# Patient Record
Sex: MALE | Race: WHITE | NOT HISPANIC OR LATINO
[De-identification: names, ages, dates, MRNs, and addresses within clinical notes are randomized per-mention and may not be internally consistent; named-entity substitution may affect disease eponyms.]

---

## 2017-01-05 ENCOUNTER — APPOINTMENT (OUTPATIENT)
Dept: UROLOGY | Facility: CLINIC | Age: 72
End: 2017-01-05

## 2017-01-05 LAB
APPEARANCE: CLEAR
BACTERIA: NEGATIVE
BASOPHILS # BLD AUTO: 0.04 K/UL
BASOPHILS NFR BLD AUTO: 0.6 %
BILIRUBIN URINE: NEGATIVE
BLOOD URINE: ABNORMAL
COLOR: YELLOW
EOSINOPHIL # BLD AUTO: 0.14 K/UL
EOSINOPHIL NFR BLD AUTO: 2 %
GLUCOSE QUALITATIVE U: 100 MG/DL
HCT VFR BLD CALC: 42.7 %
HGB BLD-MCNC: 14.3 G/DL
IMM GRANULOCYTES NFR BLD AUTO: 0.3 %
KETONES URINE: NEGATIVE
LEUKOCYTE ESTERASE URINE: NEGATIVE
LYMPHOCYTES # BLD AUTO: 1.96 K/UL
LYMPHOCYTES NFR BLD AUTO: 27.8 %
MAN DIFF?: NORMAL
MCHC RBC-ENTMCNC: 30 PG
MCHC RBC-ENTMCNC: 33.5 GM/DL
MCV RBC AUTO: 89.5 FL
MICROSCOPIC-UA: NORMAL
MONOCYTES # BLD AUTO: 0.41 K/UL
MONOCYTES NFR BLD AUTO: 5.8 %
NEUTROPHILS # BLD AUTO: 4.49 K/UL
NEUTROPHILS NFR BLD AUTO: 63.5 %
NITRITE URINE: NEGATIVE
PH URINE: 5.5
PLATELET # BLD AUTO: 235 K/UL
PROTEIN URINE: NEGATIVE MG/DL
RBC # BLD: 4.77 M/UL
RBC # FLD: 14.1 %
RED BLOOD CELLS URINE: 2 /HPF
SPECIFIC GRAVITY URINE: 1.02
SQUAMOUS EPITHELIAL CELLS: 0 /HPF
UROBILINOGEN URINE: NORMAL MG/DL
WBC # FLD AUTO: 7.06 K/UL
WHITE BLOOD CELLS URINE: 0 /HPF

## 2017-01-09 LAB
ALBUMIN SERPL ELPH-MCNC: 4.5 G/DL
ALP BLD-CCNC: 53 U/L
ALT SERPL-CCNC: 19 U/L
ANION GAP SERPL CALC-SCNC: 17 MMOL/L
AST SERPL-CCNC: 21 U/L
BACTERIA UR CULT: NORMAL
BILIRUB SERPL-MCNC: 0.4 MG/DL
BUN SERPL-MCNC: 17 MG/DL
CALCIUM SERPL-MCNC: 10.4 MG/DL
CHLORIDE SERPL-SCNC: 103 MMOL/L
CO2 SERPL-SCNC: 20 MMOL/L
CORE LAB FLUID CYTOLOGY: NORMAL
CREAT SERPL-MCNC: 0.72 MG/DL
GLUCOSE SERPL-MCNC: 136 MG/DL
POTASSIUM SERPL-SCNC: 4.2 MMOL/L
PROT SERPL-MCNC: 6.9 G/DL
PSA SERPL-MCNC: 0.75 NG/ML
SODIUM SERPL-SCNC: 140 MMOL/L
TESTOST SERPL-MCNC: 361.5 NG/DL

## 2017-07-12 ENCOUNTER — APPOINTMENT (OUTPATIENT)
Dept: UROLOGY | Facility: CLINIC | Age: 72
End: 2017-07-12

## 2020-11-03 ENCOUNTER — APPOINTMENT (OUTPATIENT)
Dept: ORTHOPEDIC SURGERY | Facility: CLINIC | Age: 75
End: 2020-11-03
Payer: MEDICARE

## 2020-11-03 VITALS — DIASTOLIC BLOOD PRESSURE: 80 MMHG | SYSTOLIC BLOOD PRESSURE: 130 MMHG | HEART RATE: 86 BPM

## 2020-11-03 VITALS — TEMPERATURE: 97.4 F

## 2020-11-03 PROCEDURE — 99072 ADDL SUPL MATRL&STAF TM PHE: CPT

## 2020-11-03 PROCEDURE — 73560 X-RAY EXAM OF KNEE 1 OR 2: CPT | Mod: RT

## 2020-11-03 PROCEDURE — 99204 OFFICE O/P NEW MOD 45 MIN: CPT | Mod: 95

## 2020-11-03 PROCEDURE — 73564 X-RAY EXAM KNEE 4 OR MORE: CPT | Mod: LT

## 2020-11-23 ENCOUNTER — OUTPATIENT (OUTPATIENT)
Dept: OUTPATIENT SERVICES | Facility: HOSPITAL | Age: 75
LOS: 1 days | End: 2020-11-23
Payer: MEDICARE

## 2020-11-23 VITALS
HEART RATE: 111 BPM | RESPIRATION RATE: 18 BRPM | SYSTOLIC BLOOD PRESSURE: 137 MMHG | TEMPERATURE: 97 F | HEIGHT: 65 IN | WEIGHT: 229.06 LBS | DIASTOLIC BLOOD PRESSURE: 88 MMHG | OXYGEN SATURATION: 96 %

## 2020-11-23 DIAGNOSIS — Z29.9 ENCOUNTER FOR PROPHYLACTIC MEASURES, UNSPECIFIED: ICD-10-CM

## 2020-11-23 DIAGNOSIS — Z01.818 ENCOUNTER FOR OTHER PREPROCEDURAL EXAMINATION: ICD-10-CM

## 2020-11-23 DIAGNOSIS — Z96.651 PRESENCE OF RIGHT ARTIFICIAL KNEE JOINT: Chronic | ICD-10-CM

## 2020-11-23 DIAGNOSIS — Z98.890 OTHER SPECIFIED POSTPROCEDURAL STATES: Chronic | ICD-10-CM

## 2020-11-23 DIAGNOSIS — Z90.89 ACQUIRED ABSENCE OF OTHER ORGANS: Chronic | ICD-10-CM

## 2020-11-23 DIAGNOSIS — M17.12 UNILATERAL PRIMARY OSTEOARTHRITIS, LEFT KNEE: ICD-10-CM

## 2020-11-23 DIAGNOSIS — Z90.49 ACQUIRED ABSENCE OF OTHER SPECIFIED PARTS OF DIGESTIVE TRACT: Chronic | ICD-10-CM

## 2020-11-23 DIAGNOSIS — R73.03 PREDIABETES: ICD-10-CM

## 2020-11-23 DIAGNOSIS — G47.33 OBSTRUCTIVE SLEEP APNEA (ADULT) (PEDIATRIC): ICD-10-CM

## 2020-11-23 LAB
A1C WITH ESTIMATED AVERAGE GLUCOSE RESULT: 8 % — HIGH (ref 4–5.6)
ANION GAP SERPL CALC-SCNC: 12 MMOL/L — SIGNIFICANT CHANGE UP (ref 5–17)
BLD GP AB SCN SERPL QL: NEGATIVE — SIGNIFICANT CHANGE UP
BUN SERPL-MCNC: 12 MG/DL — SIGNIFICANT CHANGE UP (ref 7–23)
CALCIUM SERPL-MCNC: 10.1 MG/DL — SIGNIFICANT CHANGE UP (ref 8.4–10.5)
CHLORIDE SERPL-SCNC: 101 MMOL/L — SIGNIFICANT CHANGE UP (ref 96–108)
CO2 SERPL-SCNC: 23 MMOL/L — SIGNIFICANT CHANGE UP (ref 22–31)
CREAT SERPL-MCNC: 0.56 MG/DL — SIGNIFICANT CHANGE UP (ref 0.5–1.3)
ESTIMATED AVERAGE GLUCOSE: 183 MG/DL — HIGH (ref 68–114)
GLUCOSE SERPL-MCNC: 210 MG/DL — HIGH (ref 70–99)
HCT VFR BLD CALC: 46.1 % — SIGNIFICANT CHANGE UP (ref 39–50)
HGB BLD-MCNC: 15.1 G/DL — SIGNIFICANT CHANGE UP (ref 13–17)
MCHC RBC-ENTMCNC: 29.3 PG — SIGNIFICANT CHANGE UP (ref 27–34)
MCHC RBC-ENTMCNC: 32.8 GM/DL — SIGNIFICANT CHANGE UP (ref 32–36)
MCV RBC AUTO: 89.5 FL — SIGNIFICANT CHANGE UP (ref 80–100)
MRSA PCR RESULT.: SIGNIFICANT CHANGE UP
NRBC # BLD: 0 /100 WBCS — SIGNIFICANT CHANGE UP (ref 0–0)
PLATELET # BLD AUTO: 231 K/UL — SIGNIFICANT CHANGE UP (ref 150–400)
POTASSIUM SERPL-MCNC: 3.7 MMOL/L — SIGNIFICANT CHANGE UP (ref 3.5–5.3)
POTASSIUM SERPL-SCNC: 3.7 MMOL/L — SIGNIFICANT CHANGE UP (ref 3.5–5.3)
RBC # BLD: 5.15 M/UL — SIGNIFICANT CHANGE UP (ref 4.2–5.8)
RBC # FLD: 13.8 % — SIGNIFICANT CHANGE UP (ref 10.3–14.5)
RH IG SCN BLD-IMP: POSITIVE — SIGNIFICANT CHANGE UP
S AUREUS DNA NOSE QL NAA+PROBE: SIGNIFICANT CHANGE UP
SODIUM SERPL-SCNC: 136 MMOL/L — SIGNIFICANT CHANGE UP (ref 135–145)
WBC # BLD: 7.41 K/UL — SIGNIFICANT CHANGE UP (ref 3.8–10.5)
WBC # FLD AUTO: 7.41 K/UL — SIGNIFICANT CHANGE UP (ref 3.8–10.5)

## 2020-11-23 RX ORDER — SODIUM CHLORIDE 9 MG/ML
3 INJECTION INTRAMUSCULAR; INTRAVENOUS; SUBCUTANEOUS EVERY 8 HOURS
Refills: 0 | Status: DISCONTINUED | OUTPATIENT
Start: 2020-11-30 | End: 2020-11-30

## 2020-11-23 RX ORDER — PANTOPRAZOLE SODIUM 20 MG/1
40 TABLET, DELAYED RELEASE ORAL ONCE
Refills: 0 | Status: COMPLETED | OUTPATIENT
Start: 2020-11-30 | End: 2020-11-30

## 2020-11-23 RX ORDER — LIDOCAINE HCL 20 MG/ML
0.2 VIAL (ML) INJECTION ONCE
Refills: 0 | Status: DISCONTINUED | OUTPATIENT
Start: 2020-11-30 | End: 2020-11-30

## 2020-11-23 RX ORDER — TRAMADOL HYDROCHLORIDE 50 MG/1
50 TABLET ORAL ONCE
Refills: 0 | Status: DISCONTINUED | OUTPATIENT
Start: 2020-11-30 | End: 2020-11-30

## 2020-11-23 RX ORDER — CEFAZOLIN SODIUM 1 G
2000 VIAL (EA) INJECTION ONCE
Refills: 0 | Status: DISCONTINUED | OUTPATIENT
Start: 2020-11-30 | End: 2020-12-01

## 2020-11-23 NOTE — H&P PST ADULT - NSICDXPROBLEM_GEN_ALL_CORE_FT
PROBLEM DIAGNOSES  Problem: Unilateral primary osteoarthritis, left knee  Assessment and Plan:     Problem: Prediabetes  Assessment and Plan:     Problem: Need for prophylactic measure  Assessment and Plan:     Problem: GERBER (obstructive sleep apnea)  Assessment and Plan:        PROBLEM DIAGNOSES  Problem: Unilateral primary osteoarthritis, left knee  Assessment and Plan: LEFT TOTAL KNEE REPLACEMENT  Pre-op education provided - all questions answered   Chlorhex soap & instructions provided  CBC, BMP. T&S, HA1C, MRSA/MSSA swab sent in PST    Problem: Prediabetes  Assessment and Plan: Hold Metformin after 11/29/2020 AM dose     Problem: Need for prophylactic measure  Assessment and Plan: The Caprini score indicates that this patient is at high risk for a VTE event (score 6 or greater). Surgical patients in this group will benefit from both pharmacologic prophylaxis and intermittent compression devices.  The surgical team will determine the balance between VTE risk and bleeding risk, and other clinical considerations     Problem: GERBER (obstructive sleep apnea)  Assessment and Plan: by criteria, STOP BANG 6  OR Booking notified

## 2020-11-23 NOTE — H&P PST ADULT - NEGATIVE MUSCULOSKELETAL SYMPTOMS
no muscle weakness/no neck pain/no back pain no joint swelling/no muscle weakness/no neck pain/no back pain

## 2020-11-23 NOTE — H&P PST ADULT - PSYCHIATRIC DETAILS
Subjective


Progress Note Date: 11/24/18


Principal diagnosis: 


TIA





Neurology is following a 68-year-old male for TIA. patient had complaints of 

blurry overlapping vision.  Was a one-time occurrence with changes involve 

approximately 90 seconds to 3 minutes.  Patient stated that when he covered one 

eye he had clear vision however when he attempted to use both eyes he had the 

noted changes.  Patient stated he had never had an occurrence prior to this 

event.  Patient has denied any repeat occurrences since the initial event.  

Patient presented to the ED for evaluation.  CT angiogram noted moderate plaque 

formation with more than 75% stenosis of both internal carotid arteries.  No 

evidence of intracranial aneurysm or stenosis.  CT brain noted mild atrophy.  

No acute intracranial abnormality.  MRI brain noted nonspecific white matter 

demyelination may be related to chronic small vessel ischemia.  Age-related 

atrophy.  No abnormality evident to account for patient's symptoms.





On contact, patient was alert and oriented 3, seated in bedside chair, no 

acute distress.  No patient visitors or family in the room.  Patient was 

evaluated by vascular surgery.  Patient states that he will be following with 

vascular surgery in the outpatient setting for further vascular workup and 

possible intervention.  Patient is due to have left rotator cuff surgery next 

week.








Objective





- Vital Signs


Vital signs: 


 Vital Signs











Temp  99.0 F   11/24/18 08:00


 


Pulse  68   11/24/18 08:00


 


Resp  18   11/24/18 08:00


 


BP  147/71   11/24/18 08:00


 


Pulse Ox  99   11/24/18 08:00








 Intake & Output











 11/23/18 11/24/18 11/24/18





 18:59 06:59 18:59


 


Intake Total 1902  360


 


Balance 1902  360


 


Weight  92.1 kg 


 


Intake:   


 


  Intake, IV Titration 1200  





  Amount   


 


    Sodium Chloride 0.9% 1, 600  





    000 ml @ 75 mls/hr IV .   





    [id number] DAVID Rx#:[id number]   


 


    Sodium Chloride 0.9% 1, 600  





    000 ml @ 999 mls/hr IV .   





    Q1H1M STA Rx#:[id number]   


 


  Oral 702  360


 


Other:   


 


  Voiding Method  Toilet 





  Urinal 


 


  # Voids 1 1 














- Exam


General appearance: Alert & oriented x3, no apparent distress.


Head: Atraumatic, normocephalic, normal inspection


Eyes: PERRLA, EOMI.  Absent scleral icterus, conjunctival injection, nystagmus, 

periorbital swelling.


Ear, nose and throat: Normal exam, mucous membranes moist


Neck: Normal inspection, absent tenderness, lymphadenopathy.


Respiratory: No increased work of breathing


Cardiovascular: Regular rate, rhythm


GI/abdominal: No guarding, no rigidity


Extremities: moves all extremities


Neurological: 


cranial nerves II through XII intact


no lateralizing weakness


no seizure activity noted on physical exam


no pronator drift and no nystagmus.





Strength:


full in all 4 extremities, pain with movement of left upper extremity due to 

noted right rotator cuff tear.


 


Sensation: 


Left lower extremity: normal


Right lower extremity: normal


Left upper extremity: normal 


Right upper extremity:normal 





Psychological: Mood and Affect appropriate for setting








- Labs


CBC & Chem 7: 


 11/24/18 06:38





 11/24/18 06:38


Labs: 


 Abnormal Lab Results - Last 24 Hours (Table)











  11/23/18 11/23/18 11/24/18 Range/Units





  16:40 21:11 06:05 


 


RBC     (4.30-5.90)  m/uL


 


Hgb     (13.0-17.5)  gm/dL


 


MCV     (80.0-100.0)  fL


 


Chloride     ()  mmol/L


 


Glucose     (74-99)  mg/dL


 


POC Glucose (mg/dL)  276 H  119 H  112 H  (75-99)  mg/dL














  11/24/18 11/24/18 Range/Units





  06:38 06:38 


 


RBC  3.89 L   (4.30-5.90)  m/uL


 


Hgb  12.9 L   (13.0-17.5)  gm/dL


 


MCV  100.5 H   (80.0-100.0)  fL


 


Chloride   108 H  ()  mmol/L


 


Glucose   115 H  (74-99)  mg/dL


 


POC Glucose (mg/dL)    (75-99)  mg/dL














Assessment and Plan


(1) Transient cerebral ischemia


Status: Acute   Code(s): G45.9 - TRANSIENT CEREBRAL ISCHEMIC ATTACK, 

UNSPECIFIED   SNOMED Code(s): 040042466


   





(2) Vision changes


Status: Acute   Code(s): H53.9 - UNSPECIFIED VISUAL DISTURBANCE   SNOMED Code(s)

: 930396136


   





(3) Carotid stenosis


Status: Acute   Code(s): I65.29 - OCCLUSION AND STENOSIS OF UNSPECIFIED CAROTID 

ARTERY   SNOMED Code(s): 79972139


   


Plan: 


Patient's physical exam findings and symptoms along with current diagnostic 

workup results appear more consistent with TIA.  Patient does have noted 

carotid stenosis and symptoms resolved within only 1 time occurrence.  Patient 

is currently on aspirin therapy 325 mg daily.  Continue medication therapy as 

noted.  Although patient's lipid panel is within normal limits given the patient

's carotid stenosis, patient will need to maintain antihyperlipidemic 

medication as noted Lipitor at existing dose and frequency both inpatient and 

in the outpatient setting.  Vascular surgery is commanding further outpatient 

consult for carotid stenosis and possible intervention at a later date. EEG is 

taken. Neuro checks every shift.











Neurology will clear the patient for discharge from a neurological standpoint.








I have discussed the plan of care with the physician prior to implementation 

and he agrees with the plan as implemented. normal behavior

## 2020-11-23 NOTE — H&P PST ADULT - ASSESSMENT
TRUDII VTE 2.0 SCORE [CLOT updated 2019]    AGE RELATED RISK FACTORS                                                       MOBILITY RELATED FACTORS  [ ] Age 41-60 years                                            (1 Point)                    [ ] Bed rest                                                        (1 Point)  [ ] Age: 61-74 years                                           (2 Points)                  [ ] Plaster cast                                                   (2 Points)  [3 ] Age= 75 years                                              (3 Points)                    [ ] Bed bound for more than 72 hours                 (2 Points)    DISEASE RELATED RISK FACTORS                                               GENDER SPECIFIC FACTORS  [ ] Edema in the lower extremities                       (1 Point)              [ ] Pregnancy                                                     (1 Point)  [ ] Varicose veins                                               (1 Point)                     [ ] Post-partum < 6 weeks                                   (1 Point)             [ 1] BMI > 25 Kg/m2                                            (1 Point)                     [ ] Hormonal therapy  or oral contraception          (1 Point)                 [ ] Sepsis (in the previous month)                        (1 Point)               [ ] History of pregnancy complications                 (1 point)  [ ] Pneumonia or serious lung disease                                               [ ] Unexplained or recurrent                     (1 Point)           (in the previous month)                               (1 Point)  [ ] Abnormal pulmonary function test                     (1 Point)                 SURGERY RELATED RISK FACTORS  [ ] Acute myocardial infarction                              (1 Point)               [ ]  Section                                             (1 Point)  [ ] Congestive heart failure (in the previous month)  (1 Point)      [ ] Minor surgery                                                  (1 Point)   [ ] Inflammatory bowel disease                             (1 Point)               [ ] Arthroscopic surgery                                        (2 Points)  [ ] Central venous access                                      (2 Points)                [ ] General surgery lasting more than 45 minutes (2 points)  [ ] Malignancy- Present or previous                   (2 Points)                [5 ] Elective arthroplasty                                         (5 points)    [ ] Stroke (in the previous month)                          (5 Points)                                                                                                                                                           HEMATOLOGY RELATED FACTORS                                                 TRAUMA RELATED RISK FACTORS  [ ] Prior episodes of VTE                                     (3 Points)                [ ] Fracture of the hip, pelvis, or leg                       (5 Points)  [ ] Positive family history for VTE                         (3 Points)             [ ] Acute spinal cord injury (in the previous month)  (5 Points)  [ ] Prothrombin 32733 A                                     (3 Points)               [ ] Paralysis  (less than 1 month)                             (5 Points)  [ ] Factor V Leiden                                             (3 Points)                  [ ] Multiple Trauma within 1 month                        (5 Points)  [ ] Lupus anticoagulants                                     (3 Points)                                                           [ ] Anticardiolipin antibodies                               (3 Points)                                                       [ ] High homocysteine in the blood                      (3 Points)                                             [ ] Other congenital or acquired thrombophilia      (3 Points)                                                [ ] Heparin induced thrombocytopenia                  (3 Points)                                     Total Score [  9        ]

## 2020-11-23 NOTE — H&P PST ADULT - HISTORY OF PRESENT ILLNESS
***Covid test scheduled for 11/27/2020 at [facility].  73 YO M PMH HLD, prediabetes, obesity, right TKR, c/o worsening left knee pain, presents to PST for LEFT TOTAL KNEE REPLACEMENT 11/30/2020. Denies any chest pain, SOB, N/V, fever or chills.     ***Covid test scheduled for 11/27/2020 at [facility].

## 2020-11-23 NOTE — H&P PST ADULT - NEUROLOGICAL DETAILS
----- Message from Blanca Silvestre MA sent at 10/31/2019  2:44 PM CDT -----  Contact: patient  Pt called for refill on estradiol (VIVELLE-DOT) 0.1 mg/24 .  The patient can be reached at [phone]  
Please send ERX   
alert and oriented x 3/responds to pain/responds to verbal commands/no spontaneous movement

## 2020-11-23 NOTE — H&P PST ADULT - NSICDXPASTSURGICALHX_GEN_ALL_CORE_FT
PAST SURGICAL HISTORY:  H/O elbow surgery left ~1982    H/O total knee replacement, right Nov 29, 2017    History of appendectomy 19 years old    History of tonsillectomy at 42 years old

## 2020-11-23 NOTE — H&P PST ADULT - NSANTHOSAYNRD_GEN_A_CORE
No. GERBER screening performed.  STOP BANG Legend: 0-2 = LOW Risk; 3-4 = INTERMEDIATE Risk; 5-8 = HIGH Risk

## 2020-11-27 ENCOUNTER — OUTPATIENT (OUTPATIENT)
Dept: OUTPATIENT SERVICES | Facility: HOSPITAL | Age: 75
LOS: 1 days | End: 2020-11-27
Payer: MEDICARE

## 2020-11-27 DIAGNOSIS — Z96.651 PRESENCE OF RIGHT ARTIFICIAL KNEE JOINT: Chronic | ICD-10-CM

## 2020-11-27 DIAGNOSIS — Z11.59 ENCOUNTER FOR SCREENING FOR OTHER VIRAL DISEASES: ICD-10-CM

## 2020-11-27 DIAGNOSIS — Z90.49 ACQUIRED ABSENCE OF OTHER SPECIFIED PARTS OF DIGESTIVE TRACT: Chronic | ICD-10-CM

## 2020-11-27 DIAGNOSIS — Z90.89 ACQUIRED ABSENCE OF OTHER ORGANS: Chronic | ICD-10-CM

## 2020-11-27 DIAGNOSIS — Z98.890 OTHER SPECIFIED POSTPROCEDURAL STATES: Chronic | ICD-10-CM

## 2020-11-27 LAB — SARS-COV-2 RNA SPEC QL NAA+PROBE: SIGNIFICANT CHANGE UP

## 2020-11-27 PROCEDURE — 27447 TOTAL KNEE ARTHROPLASTY: CPT | Mod: LT

## 2020-11-29 ENCOUNTER — TRANSCRIPTION ENCOUNTER (OUTPATIENT)
Age: 75
End: 2020-11-29

## 2020-11-30 ENCOUNTER — APPOINTMENT (OUTPATIENT)
Dept: ORTHOPEDIC SURGERY | Facility: HOSPITAL | Age: 75
End: 2020-11-30
Payer: MEDICARE

## 2020-11-30 ENCOUNTER — INPATIENT (INPATIENT)
Facility: HOSPITAL | Age: 75
LOS: 0 days | Discharge: HOME | DRG: 470 | End: 2020-12-01
Attending: ORTHOPAEDIC SURGERY | Admitting: ORTHOPAEDIC SURGERY
Payer: MEDICARE

## 2020-11-30 ENCOUNTER — RESULT REVIEW (OUTPATIENT)
Age: 75
End: 2020-11-30

## 2020-11-30 VITALS
RESPIRATION RATE: 16 BRPM | DIASTOLIC BLOOD PRESSURE: 88 MMHG | SYSTOLIC BLOOD PRESSURE: 133 MMHG | TEMPERATURE: 98 F | HEART RATE: 96 BPM | OXYGEN SATURATION: 98 % | WEIGHT: 229.06 LBS | HEIGHT: 65 IN

## 2020-11-30 DIAGNOSIS — Z96.651 PRESENCE OF RIGHT ARTIFICIAL KNEE JOINT: Chronic | ICD-10-CM

## 2020-11-30 DIAGNOSIS — Z90.89 ACQUIRED ABSENCE OF OTHER ORGANS: Chronic | ICD-10-CM

## 2020-11-30 DIAGNOSIS — Z90.49 ACQUIRED ABSENCE OF OTHER SPECIFIED PARTS OF DIGESTIVE TRACT: Chronic | ICD-10-CM

## 2020-11-30 DIAGNOSIS — Z98.890 OTHER SPECIFIED POSTPROCEDURAL STATES: Chronic | ICD-10-CM

## 2020-11-30 DIAGNOSIS — M17.12 UNILATERAL PRIMARY OSTEOARTHRITIS, LEFT KNEE: ICD-10-CM

## 2020-11-30 LAB
GLUCOSE BLDC GLUCOMTR-MCNC: 145 MG/DL — HIGH (ref 70–99)
GLUCOSE BLDC GLUCOMTR-MCNC: 207 MG/DL — HIGH (ref 70–99)
GLUCOSE BLDC GLUCOMTR-MCNC: 265 MG/DL — HIGH (ref 70–99)
RH IG SCN BLD-IMP: POSITIVE — SIGNIFICANT CHANGE UP

## 2020-11-30 PROCEDURE — 88305 TISSUE EXAM BY PATHOLOGIST: CPT | Mod: 26

## 2020-11-30 PROCEDURE — 73560 X-RAY EXAM OF KNEE 1 OR 2: CPT | Mod: 26,LT

## 2020-11-30 PROCEDURE — 88311 DECALCIFY TISSUE: CPT | Mod: 26

## 2020-11-30 RX ORDER — SODIUM CHLORIDE 9 MG/ML
500 INJECTION INTRAMUSCULAR; INTRAVENOUS; SUBCUTANEOUS ONCE
Refills: 0 | Status: COMPLETED | OUTPATIENT
Start: 2020-11-30 | End: 2020-11-30

## 2020-11-30 RX ORDER — MAGNESIUM HYDROXIDE 400 MG/1
30 TABLET, CHEWABLE ORAL DAILY
Refills: 0 | Status: DISCONTINUED | OUTPATIENT
Start: 2020-11-30 | End: 2020-12-01

## 2020-11-30 RX ORDER — OXYCODONE HYDROCHLORIDE 5 MG/1
5 TABLET ORAL EVERY 4 HOURS
Refills: 0 | Status: DISCONTINUED | OUTPATIENT
Start: 2020-11-30 | End: 2020-12-01

## 2020-11-30 RX ORDER — CELECOXIB 200 MG/1
200 CAPSULE ORAL EVERY 12 HOURS
Refills: 0 | Status: DISCONTINUED | OUTPATIENT
Start: 2020-12-02 | End: 2020-12-01

## 2020-11-30 RX ORDER — SENNA PLUS 8.6 MG/1
2 TABLET ORAL AT BEDTIME
Refills: 0 | Status: DISCONTINUED | OUTPATIENT
Start: 2020-11-30 | End: 2020-12-01

## 2020-11-30 RX ORDER — SODIUM CHLORIDE 9 MG/ML
1000 INJECTION, SOLUTION INTRAVENOUS
Refills: 0 | Status: DISCONTINUED | OUTPATIENT
Start: 2020-11-30 | End: 2020-12-01

## 2020-11-30 RX ORDER — ONDANSETRON 8 MG/1
4 TABLET, FILM COATED ORAL ONCE
Refills: 0 | Status: DISCONTINUED | OUTPATIENT
Start: 2020-11-30 | End: 2020-11-30

## 2020-11-30 RX ORDER — HYDROMORPHONE HYDROCHLORIDE 2 MG/ML
0.5 INJECTION INTRAMUSCULAR; INTRAVENOUS; SUBCUTANEOUS EVERY 4 HOURS
Refills: 0 | Status: DISCONTINUED | OUTPATIENT
Start: 2020-11-30 | End: 2020-12-01

## 2020-11-30 RX ORDER — SIMVASTATIN 20 MG/1
40 TABLET, FILM COATED ORAL AT BEDTIME
Refills: 0 | Status: DISCONTINUED | OUTPATIENT
Start: 2020-11-30 | End: 2020-12-01

## 2020-11-30 RX ORDER — SODIUM CHLORIDE 9 MG/ML
1000 INJECTION INTRAMUSCULAR; INTRAVENOUS; SUBCUTANEOUS
Refills: 0 | Status: DISCONTINUED | OUTPATIENT
Start: 2020-11-30 | End: 2020-12-01

## 2020-11-30 RX ORDER — CEFAZOLIN SODIUM 1 G
2000 VIAL (EA) INJECTION EVERY 8 HOURS
Refills: 0 | Status: COMPLETED | OUTPATIENT
Start: 2020-11-30 | End: 2020-12-01

## 2020-11-30 RX ORDER — GLUCAGON INJECTION, SOLUTION 0.5 MG/.1ML
1 INJECTION, SOLUTION SUBCUTANEOUS ONCE
Refills: 0 | Status: DISCONTINUED | OUTPATIENT
Start: 2020-11-30 | End: 2020-12-01

## 2020-11-30 RX ORDER — DEXTROSE 50 % IN WATER 50 %
15 SYRINGE (ML) INTRAVENOUS ONCE
Refills: 0 | Status: DISCONTINUED | OUTPATIENT
Start: 2020-11-30 | End: 2020-12-01

## 2020-11-30 RX ORDER — INSULIN LISPRO 100/ML
VIAL (ML) SUBCUTANEOUS
Refills: 0 | Status: DISCONTINUED | OUTPATIENT
Start: 2020-11-30 | End: 2020-12-01

## 2020-11-30 RX ORDER — DEXTROSE 50 % IN WATER 50 %
12.5 SYRINGE (ML) INTRAVENOUS ONCE
Refills: 0 | Status: DISCONTINUED | OUTPATIENT
Start: 2020-11-30 | End: 2020-12-01

## 2020-11-30 RX ORDER — DEXTROSE 50 % IN WATER 50 %
25 SYRINGE (ML) INTRAVENOUS ONCE
Refills: 0 | Status: DISCONTINUED | OUTPATIENT
Start: 2020-11-30 | End: 2020-12-01

## 2020-11-30 RX ORDER — POLYETHYLENE GLYCOL 3350 17 G/17G
17 POWDER, FOR SOLUTION ORAL DAILY
Refills: 0 | Status: DISCONTINUED | OUTPATIENT
Start: 2020-11-30 | End: 2020-12-01

## 2020-11-30 RX ORDER — ACETAMINOPHEN 500 MG
1000 TABLET ORAL EVERY 8 HOURS
Refills: 0 | Status: COMPLETED | OUTPATIENT
Start: 2020-11-30 | End: 2020-12-01

## 2020-11-30 RX ORDER — SODIUM CHLORIDE 9 MG/ML
1000 INJECTION, SOLUTION INTRAVENOUS
Refills: 0 | Status: DISCONTINUED | OUTPATIENT
Start: 2020-11-30 | End: 2020-11-30

## 2020-11-30 RX ORDER — INSULIN LISPRO 100/ML
VIAL (ML) SUBCUTANEOUS AT BEDTIME
Refills: 0 | Status: DISCONTINUED | OUTPATIENT
Start: 2020-11-30 | End: 2020-12-01

## 2020-11-30 RX ORDER — SODIUM CHLORIDE 9 MG/ML
500 INJECTION INTRAMUSCULAR; INTRAVENOUS; SUBCUTANEOUS ONCE
Refills: 0 | Status: COMPLETED | OUTPATIENT
Start: 2020-12-01 | End: 2020-12-01

## 2020-11-30 RX ORDER — ASPIRIN/CALCIUM CARB/MAGNESIUM 324 MG
325 TABLET ORAL
Refills: 0 | Status: DISCONTINUED | OUTPATIENT
Start: 2020-11-30 | End: 2020-12-01

## 2020-11-30 RX ORDER — PANTOPRAZOLE SODIUM 20 MG/1
40 TABLET, DELAYED RELEASE ORAL
Refills: 0 | Status: DISCONTINUED | OUTPATIENT
Start: 2020-11-30 | End: 2020-12-01

## 2020-11-30 RX ORDER — ONDANSETRON 8 MG/1
4 TABLET, FILM COATED ORAL EVERY 6 HOURS
Refills: 0 | Status: DISCONTINUED | OUTPATIENT
Start: 2020-11-30 | End: 2020-12-01

## 2020-11-30 RX ORDER — ACETAMINOPHEN 500 MG
975 TABLET ORAL EVERY 8 HOURS
Refills: 0 | Status: DISCONTINUED | OUTPATIENT
Start: 2020-12-01 | End: 2020-12-01

## 2020-11-30 RX ORDER — KETOROLAC TROMETHAMINE 30 MG/ML
15 SYRINGE (ML) INJECTION EVERY 8 HOURS
Refills: 0 | Status: DISCONTINUED | OUTPATIENT
Start: 2020-11-30 | End: 2020-12-01

## 2020-11-30 RX ORDER — HYDROMORPHONE HYDROCHLORIDE 2 MG/ML
0.5 INJECTION INTRAMUSCULAR; INTRAVENOUS; SUBCUTANEOUS
Refills: 0 | Status: DISCONTINUED | OUTPATIENT
Start: 2020-11-30 | End: 2020-11-30

## 2020-11-30 RX ORDER — TRAMADOL HYDROCHLORIDE 50 MG/1
50 TABLET ORAL EVERY 8 HOURS
Refills: 0 | Status: DISCONTINUED | OUTPATIENT
Start: 2020-11-30 | End: 2020-12-01

## 2020-11-30 RX ORDER — OXYCODONE HYDROCHLORIDE 5 MG/1
10 TABLET ORAL EVERY 4 HOURS
Refills: 0 | Status: DISCONTINUED | OUTPATIENT
Start: 2020-11-30 | End: 2020-12-01

## 2020-11-30 RX ADMIN — Medication 100 MILLIGRAM(S): at 23:25

## 2020-11-30 RX ADMIN — SODIUM CHLORIDE 1000 MILLILITER(S): 9 INJECTION INTRAMUSCULAR; INTRAVENOUS; SUBCUTANEOUS at 18:31

## 2020-11-30 RX ADMIN — SODIUM CHLORIDE 1000 MILLILITER(S): 9 INJECTION INTRAMUSCULAR; INTRAVENOUS; SUBCUTANEOUS at 21:04

## 2020-11-30 RX ADMIN — Medication 400 MILLIGRAM(S): at 21:02

## 2020-11-30 RX ADMIN — PANTOPRAZOLE SODIUM 40 MILLIGRAM(S): 20 TABLET, DELAYED RELEASE ORAL at 14:10

## 2020-11-30 RX ADMIN — TRAMADOL HYDROCHLORIDE 50 MILLIGRAM(S): 50 TABLET ORAL at 14:11

## 2020-11-30 RX ADMIN — Medication 3: at 18:26

## 2020-11-30 RX ADMIN — SODIUM CHLORIDE 100 MILLILITER(S): 9 INJECTION INTRAMUSCULAR; INTRAVENOUS; SUBCUTANEOUS at 19:37

## 2020-11-30 RX ADMIN — SIMVASTATIN 40 MILLIGRAM(S): 20 TABLET, FILM COATED ORAL at 21:04

## 2020-11-30 RX ADMIN — Medication 1000 MILLIGRAM(S): at 21:32

## 2020-11-30 RX ADMIN — OXYCODONE HYDROCHLORIDE 10 MILLIGRAM(S): 5 TABLET ORAL at 23:53

## 2020-11-30 RX ADMIN — Medication 325 MILLIGRAM(S): at 21:04

## 2020-11-30 RX ADMIN — OXYCODONE HYDROCHLORIDE 10 MILLIGRAM(S): 5 TABLET ORAL at 23:23

## 2020-11-30 RX ADMIN — Medication 15 MILLIGRAM(S): at 19:19

## 2020-11-30 RX ADMIN — Medication 15 MILLIGRAM(S): at 19:30

## 2020-11-30 RX ADMIN — Medication 150 MILLIGRAM(S): at 14:11

## 2020-11-30 NOTE — PHYSICAL THERAPY INITIAL EVALUATION ADULT - PLANNED THERAPY INTERVENTIONS, PT EVAL
GOAL: Pt will negotiate 2 flights of steps +UHR independently in 2 weeks//balance training/bed mobility training/gait training/strengthening/transfer training

## 2020-11-30 NOTE — INPATIENT CERTIFICATION FOR MEDICARE PATIENTS - IN ORDER TO MEET MEDICARE REQUIREMENTS.
In order to meet Medicare requirements, the clinical documentation must support the information cited in the admission order.  Please be sure to provide detailed and clear documentation about the following in the admitting note/history and physical: no nausea/no vomiting/no diarrhea/no constipation

## 2020-11-30 NOTE — PHYSICAL THERAPY INITIAL EVALUATION ADULT - PERTINENT HX OF CURRENT PROBLEM, REHAB EVAL
Pt is a 75 YO M PMH HLD, prediabetes, obesity, right TKR, c/o worsening left knee pain. Now s/p L TKA on 11/30/20. Denies any chest pain, SOB, N/V, fever or chills

## 2020-11-30 NOTE — CHART NOTE - NSCHARTNOTEFT_GEN_A_CORE
POC    Resting without complaints in RR  Trying to Void   No Chest Pain, SOB, N/V.    T(C): 36 (11-30-20 @ 17:45), Max: 36.8 (11-30-20 @ 10:54)  HR: 92 (11-30-20 @ 19:15) (89 - 96)  BP: 135/70 (11-30-20 @ 19:00) (133/64 - 146/64)  RR: 16 (11-30-20 @ 19:15) (16 - 20)  SpO2: 97% (11-30-20 @ 19:15) (94% - 98%)  Wt(kg): --    Exam:  Alert and [place], No Acute Distress  Pulm: CTAB  Abdomen soft / benign / protuberant  Ardon  [ n]   EXT   LLE      Aquacel dressing C/D [x ] w/ spot of blood noted mid-bandage       Calves soft       (+) DF  PF;  EHL/FHL 5/5        No Sensory Deficits noted        2+ pulses    RLE:  incision well healed  n/v/i      Xray:----  PENDING      A/P: S/p L TKA    -PT/OT-WBAT-  -Chk AM Labs  -DVT PPx: Ecotrin   -Pain Control PO/IV Pain Rx  -Continue Current Tx    ** consider straight catheterize IF unable to void  -Dispo planning: anticipate home        ***See Above  Kwame HINDS  Orthopedics  B: [phone]  S: [phone]

## 2020-11-30 NOTE — PHYSICAL THERAPY INITIAL EVALUATION ADULT - ADDITIONAL COMMENTS
PTA pt was independent with functional mobility and ADLs. Pt lives in a PH with family with 18 steps to enter.

## 2020-12-01 ENCOUNTER — TRANSCRIPTION ENCOUNTER (OUTPATIENT)
Age: 75
End: 2020-12-01

## 2020-12-01 VITALS — SYSTOLIC BLOOD PRESSURE: 125 MMHG | HEART RATE: 90 BPM | OXYGEN SATURATION: 95 % | DIASTOLIC BLOOD PRESSURE: 70 MMHG

## 2020-12-01 LAB
ANION GAP SERPL CALC-SCNC: 13 MMOL/L — SIGNIFICANT CHANGE UP (ref 5–17)
BUN SERPL-MCNC: 16 MG/DL — SIGNIFICANT CHANGE UP (ref 7–23)
CALCIUM SERPL-MCNC: 8.8 MG/DL — SIGNIFICANT CHANGE UP (ref 8.4–10.5)
CHLORIDE SERPL-SCNC: 100 MMOL/L — SIGNIFICANT CHANGE UP (ref 96–108)
CO2 SERPL-SCNC: 23 MMOL/L — SIGNIFICANT CHANGE UP (ref 22–31)
CREAT SERPL-MCNC: 0.63 MG/DL — SIGNIFICANT CHANGE UP (ref 0.5–1.3)
GLUCOSE BLDC GLUCOMTR-MCNC: 201 MG/DL — HIGH (ref 70–99)
GLUCOSE BLDC GLUCOMTR-MCNC: 250 MG/DL — HIGH (ref 70–99)
GLUCOSE SERPL-MCNC: 234 MG/DL — HIGH (ref 70–99)
HCT VFR BLD CALC: 39.8 % — SIGNIFICANT CHANGE UP (ref 39–50)
HGB BLD-MCNC: 13.1 G/DL — SIGNIFICANT CHANGE UP (ref 13–17)
MCHC RBC-ENTMCNC: 29.2 PG — SIGNIFICANT CHANGE UP (ref 27–34)
MCHC RBC-ENTMCNC: 32.9 GM/DL — SIGNIFICANT CHANGE UP (ref 32–36)
MCV RBC AUTO: 88.8 FL — SIGNIFICANT CHANGE UP (ref 80–100)
NRBC # BLD: 0 /100 WBCS — SIGNIFICANT CHANGE UP (ref 0–0)
PLATELET # BLD AUTO: 227 K/UL — SIGNIFICANT CHANGE UP (ref 150–400)
POTASSIUM SERPL-MCNC: 3.9 MMOL/L — SIGNIFICANT CHANGE UP (ref 3.5–5.3)
POTASSIUM SERPL-SCNC: 3.9 MMOL/L — SIGNIFICANT CHANGE UP (ref 3.5–5.3)
RBC # BLD: 4.48 M/UL — SIGNIFICANT CHANGE UP (ref 4.2–5.8)
RBC # FLD: 13.7 % — SIGNIFICANT CHANGE UP (ref 10.3–14.5)
SODIUM SERPL-SCNC: 136 MMOL/L — SIGNIFICANT CHANGE UP (ref 135–145)
WBC # BLD: 13.32 K/UL — HIGH (ref 3.8–10.5)
WBC # FLD AUTO: 13.32 K/UL — HIGH (ref 3.8–10.5)

## 2020-12-01 RX ORDER — ASPIRIN/CALCIUM CARB/MAGNESIUM 324 MG
1 TABLET ORAL
Qty: 84 | Refills: 0
Start: 2020-12-01 | End: 2021-01-11

## 2020-12-01 RX ORDER — ACETAMINOPHEN 500 MG
3 TABLET ORAL
Qty: 0 | Refills: 0 | DISCHARGE
Start: 2020-12-01

## 2020-12-01 RX ORDER — SENNA PLUS 8.6 MG/1
2 TABLET ORAL
Qty: 0 | Refills: 0 | DISCHARGE
Start: 2020-12-01

## 2020-12-01 RX ORDER — TRAMADOL HYDROCHLORIDE 50 MG/1
1 TABLET ORAL
Qty: 21 | Refills: 0
Start: 2020-12-01

## 2020-12-01 RX ORDER — OXYCODONE HYDROCHLORIDE 5 MG/1
1 TABLET ORAL
Qty: 40 | Refills: 0
Start: 2020-12-01

## 2020-12-01 RX ADMIN — Medication 2: at 07:52

## 2020-12-01 RX ADMIN — Medication 15 MILLIGRAM(S): at 03:00

## 2020-12-01 RX ADMIN — Medication 15 MILLIGRAM(S): at 11:27

## 2020-12-01 RX ADMIN — PANTOPRAZOLE SODIUM 40 MILLIGRAM(S): 20 TABLET, DELAYED RELEASE ORAL at 05:31

## 2020-12-01 RX ADMIN — SODIUM CHLORIDE 1000 MILLILITER(S): 9 INJECTION INTRAMUSCULAR; INTRAVENOUS; SUBCUTANEOUS at 07:49

## 2020-12-01 RX ADMIN — Medication 2: at 12:09

## 2020-12-01 RX ADMIN — Medication 1 TABLET(S): at 11:27

## 2020-12-01 RX ADMIN — Medication 15 MILLIGRAM(S): at 03:30

## 2020-12-01 RX ADMIN — Medication 400 MILLIGRAM(S): at 05:31

## 2020-12-01 RX ADMIN — SODIUM CHLORIDE 100 MILLILITER(S): 9 INJECTION INTRAMUSCULAR; INTRAVENOUS; SUBCUTANEOUS at 05:31

## 2020-12-01 RX ADMIN — Medication 100 MILLIGRAM(S): at 06:14

## 2020-12-01 RX ADMIN — Medication 75 MILLIGRAM(S): at 05:31

## 2020-12-01 RX ADMIN — Medication 1000 MILLIGRAM(S): at 06:01

## 2020-12-01 RX ADMIN — Medication 325 MILLIGRAM(S): at 07:49

## 2020-12-01 NOTE — DISCHARGE NOTE NURSING/CASE MANAGEMENT/SOCIAL WORK - PATIENT PORTAL LINK FT
You can access the FollowMyHealth Patient Portal offered by [facility] by registering at the following website: [email or web address]. By joining [employer]s FollowMyHealth portal, you will also be able to view your health information using other applications (apps) compatible with our system.

## 2020-12-01 NOTE — PROGRESS NOTE ADULT - ATTENDING COMMENTS
Patient is a 74y old  Male who presents with a chief complaint of LTKA (01 Dec 2020 06:59)        Patient comfortable:    No complaints    I agree with the resident or Physician's Assistant current note.      Alert, NAD      Physical therapy. Patient is ambulating, progressing with ROM, sitting comfortably.       Plan for Disposition:  home     Nikolai Schaeffer MD  [facility]  [phone]

## 2020-12-01 NOTE — DISCHARGE NOTE PROVIDER - CARE PROVIDER_API CALL
Nikolai Schaeffer  ORTHOPAEDIC SURGERY  [address], [address]  [place] [address]  Phone: [phone]  Fax: [phone]  Follow Up Time:

## 2020-12-01 NOTE — OCCUPATIONAL THERAPY INITIAL EVALUATION ADULT - PERTINENT HX OF CURRENT PROBLEM, REHAB EVAL
Pt is a 75 YO M PMH HLD, prediabetes, obesity, right TKR, c/o worsening left knee pain. Now s/p L TKA on 11/30/20.  PROCEDURES: Total knee replacement 01-Dec-2020 06:27:27  Ahsan Sears.

## 2020-12-01 NOTE — DISCHARGE NOTE PROVIDER - HOSPITAL COURSE
History of Present Illness	  75 YO M PMH HLD, prediabetes, obesity, right TKR, c/o worsening left knee pain, presents to PST for LEFT TOTAL KNEE REPLACEMENT 11/30/2020. Denies any chest pain, SOB, N/V, fever or chills.     ***Covid test scheduled for 11/27/2020 at [facility].     Allergies/Medications:   Allergies:        Allergies:  	No Known Allergies:     PMH/PSH/FH/SH:    Past Medical, Past Surgical, and Family History:  PAST MEDICAL HISTORY:  Hyperlipidemia   Left knee pain   Obesity   Prediabetes.     PAST SURGICAL HISTORY:  H/O elbow surgery left ~1982  H/O total knee replacement, right Nov 29, 2017  History of appendectomy 19 years old  History of tonsillectomy at 42 years old.    This is a 74 year old Male admitted to [facility] on 11/30/20 for an elective total knee arthroplasty.  Surgery was uncomplicated.  Evaluated and treated by PT, recommended for Home.  Remain of hospital stay unremarkable, and patient discharged home when PT cleared.

## 2020-12-01 NOTE — OCCUPATIONAL THERAPY INITIAL EVALUATION ADULT - LIVES WITH, PROFILE
Pt lives in a pvt home +18 [address]OT[address] w/ his wife, has a tub shower, no use of AE or DME, was I w/ ADLs and IADLs PTA.

## 2020-12-01 NOTE — DISCHARGE NOTE PROVIDER - NSDCMRMEDTOKEN_GEN_ALL_CORE_FT
aspirin 81 mg oral tablet: 1 tab(s) orally once a day  metFORMIN 500 mg oral tablet: 1 tab(s) orally once a day  simvastatin 40 mg oral tablet: 1 tab(s) orally once a day (at bedtime)  Vitamin B12 1000 mcg oral tablet: 1 tab(s) orally once a day  Vitamin D3 2000 intl units (50 mcg) oral tablet: 1 tab(s) orally once a day   acetaminophen 325 mg oral tablet: 3 tab(s) orally every 8 hours, As Needed, mild pain, temp &gt;100.4F  Ecotrin 325 mg oral delayed release tablet: 1 tab(s) orally 2 times a day x 6 weeks total for dvt prevention MDD:2  metFORMIN 500 mg oral tablet: 1 tab(s) orally once a day  Multiple Vitamins oral tablet: 1 tab(s) orally once a day  naproxen 500 mg oral tablet: 1 tab(s) orally every 12 hours, As Needed, for pain. MDD:2  oxyCODONE 5 mg oral tablet: 1-2 tab(s) orally every 4-6 hours, As Needed for moderate to severe pain MDD:8  senna oral tablet: 2 tab(s) orally once a day (at bedtime), As needed, Constipation  simvastatin 40 mg oral tablet: 1 tab(s) orally once a day (at bedtime)  traMADol 50 mg oral tablet: 1 tab(s) orally every 8 hours, As needed, Mild Pain (1 - 3) MDD:3  Vitamin B12 1000 mcg oral tablet: 1 tab(s) orally once a day  Vitamin D3 2000 intl units (50 mcg) oral tablet: 1 tab(s) orally once a day

## 2020-12-01 NOTE — DISCHARGE NOTE PROVIDER - NSDCACTIVITY_GEN_ALL_CORE
Name: Sam ZHAO [facility]      :  1943    PCP:   Thanh Day NP      Referring:  As above  MRN:   [id number]    Chief Complaint: tremor    HISTORY OF PRESENT ILLNESS:     This is a 76 y.o. female  has a past medical history of Anemia, Atrial fibrillation ([address] Atrial fibrillation with rapid ventricular response ([address] Depression, GERD (gastroesophageal reflux disease), Hypothyroidism, and Ill-defined condition. She is referred today to discuss tremors. Pt reports that starting in her 35s, left hand would shake when she would use it, but no res tremor. Last year started to have tremor in right hand when using it, no resting tremor, and worse when under stress. Daughter told her that over the past few months her head would therese intermittently. She reports hands tremble when she's holding things and at times she's spilled food or drink, and it makes it harder to write. Pt reports that she's been under chronic stress x 2 years. She's living with her daughter and 2 grand kids and says she's taken on a lot of the responsibility of taking care of the grand kids. Other symptoms: balance difficulty, \"unbelievable fatigue,\" sleepiness, \"just wants to lay down,\" \"tremendous reduction in energy,\" has lower leg aching (bilateral) which is more noticeable when she's resting in bed. She reports having a hx of Hashimoto and sees and endocrinologist, sees a Hematologist regarding anemia (last transfusion was 1 year ago).       Complete Review of Systems: anxiety, constipation, depression, memory loss, muscle pain, muscle weakness, dyspnea, palpitations, snoring, stomach pain, vertigo; otherwise as noted in HPI     No Known Allergies  Past Medical History:   Diagnosis Date    Anemia     Atrial fibrillation ([address]     s/p pulm vein isolation     Atrial fibrillation with rapid ventricular response ([address] 2017    Depression     GERD (gastroesophageal reflux disease)     Hypothyroidism     Ill-defined condition bleeding in liver while on Xarelto     Current Outpatient Medications   Medication Sig Dispense Refill    topiramate (TOPAMAX) 25 mg tablet Week 1: 1 tab in PM.  Week 2: 1 tab twice a day. Week 3: 1 tab AM and 2 tabs PM.  Week 4: CHANGE TO 50 mg tablets 45 Tab 0    topiramate (TOPAMAX) 50 mg tablet Take 1 tab twice a day for migraine prevention 60 Tab 2    aspirin delayed-release 81 mg tablet Take 81 mg by mouth daily.  levothyroxine (SYNTHROID) 88 mcg tablet Take 88 mcg by mouth Daily (before breakfast).        Past Surgical History:   Procedure Laterality Date    CARDIAC SURG PROCEDURE UNLIST      cardioversionx4    COLONOSCOPY N/A 9/6/2017    COLON[facility]COPY [facility] by Houston Baker [facility] HX AFIB ABLATION  2016    HX CATARACT REMOVAL Bilateral     HX CHOLECYSTECTOMY  08/26/2016    HX ORTHOPAEDIC Bilateral     arthroscopic knee surgery     Family History   Problem Relation Age of Onset    Heart Disease Father     Cancer Paternal Aunt      Social History     Socioeconomic History    Marital status: SINGLE     Spouse name: Not on file    Number of children: Not on file    Years of education: Not on file    Highest education level: Not on file   Social Needs    Financial resource strain: Not on file    Food insecurity - worry: Not on file    Food insecurity - inability: Not on file   [employer] needs - medical: Not on file   [employer] needs - non-medical: Not on file   Occupational History    Not on file   Tobacco Use    Smoking status: Current Every Day Smoker     Packs/day: 0.50     Years: 50.00     Pack years: 25.00    Smokeless tobacco: Never Used   Substance and Sexual Activity    Alcohol use: No    Drug use: No    Sexual activity: Not Currently     Partners: Male   Other Topics Concern    Not on file   Social History Narrative    Not on file       PHYSICAL EXAM  Vitals:    01/03/19 1026   BP: 128/80   Pulse: 73   Resp: 20   SpO2: 97%   Weight: 69.5 kg (153 lb 3.2 oz)   Height: 5' 4\" (1.626 m)       General:  Alert, cooperative, NAD   Head:  Normocephalic, atraumatic. Eyes:  Conjunctivae/corneas clear   Lungs:  Heart:  Non labored breathing  Regular rate, rhythm   Extremities: No edema. Skin: No rashes    Neurologic Exam       Language: normal  Memory:  Alert, oriented to person, place, situation    Cranial Nerves:  I: smell Not tested   II: visual fields Full to confrontation   II: pupils Equal, round, reactive to light   II: optic disc No papilledema   III,VII: ptosis none   III,IV,VI: extraocular muscles  normal   V: facial light touch sensation  normal   VII: facial muscle function  symmetric   VIII: hearing symmetric   IX: soft palate elevation  normal   XI: sternocleidomastoid strength 5/5   XII: tongue  midline      Motor: normal bulk, tone, strength in all exts  Sensory: reduced temp in feet up to mid-calves, normal proprioception/ vibration/ LT in both legs, normal LT, vibration, temperature in both upper exts. Cerebellar: no rest tremors, no cogwheel rigidity. Bilateral postural tremor (left worse than right), mild intention tremor bilaterally. Mild intermittent head titubation. No dystaxia with FNF and H-Shin bilaterally. Reflexes: 1+ biceps, trace patellars and achilles (symmetric). Plantar response: neutral bilaterally. Gait: normal gait including tandem, no shuffling. Romberg negative       ASSESSMENT AND PLAN    ICD-10-CM ICD-9-CM    1. Essential tremor G25.0 333.1 topiramate (TOPAMAX) 25 mg tablet      topiramate (TOPAMAX) 50 mg tablet   2. Fatigue, unspecified type R53.83 780.79        D/w patient that no exam findings to suggest Parkinson's. Hx and exam today suggest she has Essential tremor. Pt says recent TSH was normal.  D/w her that ET can be worse with lack of sleep, excessive caffeine intake, or ongoing stressors which she has. As it's interefering with her daily function, we discussed starting treatment.   Will try Topamax 25 mg working up to 50 mg BID. Want to avoid Propranolol for now as she's already dealing with fatigue and depression issues. If tremor not improving despite medication, will then order MRI Brain to look for other causes. Could not order Vit D level regarding fatigue due to [facility] rejection saying it wasn't covered for that diagnosis, which doesn't make sense. Advised pt to follow up with PCP regarding fatigue complaint (may be related to ongoing depression). Follow up in 6 weeks      Thank you for allowing me to be a part of your patient's care. Please call me at [phone] if you have any questions.      Sincerely,  Darin Head MD Do not drive or operate machinery/Showering allowed/Do not make important decisions/Stairs allowed/Walking - Indoors allowed/No heavy lifting/straining/Walking - Outdoors allowed

## 2020-12-01 NOTE — PROGRESS NOTE ADULT - SUBJECTIVE AND OBJECTIVE BOX
Orthopedic Surgery Progress Note  S: Pain is well controlled.  No nausea, vomiting, chest pain, shortness of breath, lightheadedness, or dizziness. Has not ambulated yet after surgery, but planning to work with PT this morning. Went to rehab after other TKA, however would potentially be interested in going home if cleared to do so this time.    O:  Vital Signs Last 24 Hrs  T(C): 36.7 (01 Dec 2020 05:13), Max: 36.8 (30 Nov 2020 10:54)  T(F): 98 (01 Dec 2020 05:13), Max: 98.2 (30 Nov 2020 10:54)  HR: 76 (01 Dec 2020 05:13) (76 - 96)  BP: 124/73 (01 Dec 2020 05:13) (124/73 - 146/64)  BP(mean): 94 (30 Nov 2020 19:30) (89 - 98)  RR: 18 (01 Dec 2020 05:13) (16 - 20)  SpO2: 96% (01 Dec 2020 05:13) (94% - 100%)    Gen: NAD  LLE  Dressing clean, dry, intact  EHL/FHL/TA/GS intact  SILT DP/SP/[place]/Sa  WWP distally    A/P 74y year old Male POD s/p L TKA    Pain Control  WBAT  PT/OOB  DVT PPx: aspirin 325  Dispo Planning: pending PT recs    Jessee Granados MD

## 2020-12-02 ENCOUNTER — NON-APPOINTMENT (OUTPATIENT)
Age: 75
End: 2020-12-02

## 2020-12-09 LAB — SURGICAL PATHOLOGY STUDY: SIGNIFICANT CHANGE UP

## 2020-12-15 ENCOUNTER — APPOINTMENT (OUTPATIENT)
Dept: ORTHOPEDIC SURGERY | Facility: CLINIC | Age: 75
End: 2020-12-15
Payer: MEDICARE

## 2020-12-15 PROCEDURE — 73562 X-RAY EXAM OF KNEE 3: CPT | Mod: LT

## 2020-12-15 PROCEDURE — 99024 POSTOP FOLLOW-UP VISIT: CPT

## 2020-12-15 PROCEDURE — 73560 X-RAY EXAM OF KNEE 1 OR 2: CPT | Mod: RT

## 2021-01-13 ENCOUNTER — APPOINTMENT (OUTPATIENT)
Dept: ORTHOPEDIC SURGERY | Facility: CLINIC | Age: 76
End: 2021-01-13
Payer: MEDICARE

## 2021-01-13 VITALS — TEMPERATURE: 97.1 F

## 2021-01-13 DIAGNOSIS — Z96.651 PRESENCE OF RIGHT ARTIFICIAL KNEE JOINT: ICD-10-CM

## 2021-01-13 PROCEDURE — 99024 POSTOP FOLLOW-UP VISIT: CPT

## 2021-01-27 ENCOUNTER — APPOINTMENT (OUTPATIENT)
Dept: UROLOGY | Facility: CLINIC | Age: 76
End: 2021-01-27
Payer: MEDICARE

## 2021-01-27 VITALS
HEART RATE: 120 BPM | HEIGHT: 66 IN | SYSTOLIC BLOOD PRESSURE: 142 MMHG | TEMPERATURE: 97.8 F | RESPIRATION RATE: 20 BRPM | WEIGHT: 227 LBS | BODY MASS INDEX: 36.48 KG/M2 | DIASTOLIC BLOOD PRESSURE: 87 MMHG

## 2021-01-27 PROCEDURE — 99204 OFFICE O/P NEW MOD 45 MIN: CPT

## 2021-01-27 PROCEDURE — 99072 ADDL SUPL MATRL&STAF TM PHE: CPT

## 2021-01-31 LAB
ALP BLD-CCNC: 62 U/L
ANION GAP SERPL CALC-SCNC: 16 MMOL/L
BUN SERPL-MCNC: 13 MG/DL
CALCIUM SERPL-MCNC: 10.4 MG/DL
CHLORIDE SERPL-SCNC: 100 MMOL/L
CO2 SERPL-SCNC: 23 MMOL/L
CREAT SERPL-MCNC: 0.74 MG/DL
GLUCOSE SERPL-MCNC: 102 MG/DL
POTASSIUM SERPL-SCNC: 4 MMOL/L
PSA SERPL-MCNC: 1.37 NG/ML
SODIUM SERPL-SCNC: 139 MMOL/L
TESTOST SERPL-MCNC: 492 NG/DL

## 2021-02-23 ENCOUNTER — APPOINTMENT (OUTPATIENT)
Dept: UROLOGY | Facility: CLINIC | Age: 76
End: 2021-02-23
Payer: MEDICARE

## 2021-02-23 VITALS — SYSTOLIC BLOOD PRESSURE: 152 MMHG | DIASTOLIC BLOOD PRESSURE: 91 MMHG | TEMPERATURE: 97.2 F | HEART RATE: 108 BPM

## 2021-02-23 DIAGNOSIS — R82.90 UNSPECIFIED ABNORMAL FINDINGS IN URINE: ICD-10-CM

## 2021-02-23 PROCEDURE — 99072 ADDL SUPL MATRL&STAF TM PHE: CPT

## 2021-02-23 PROCEDURE — 99214 OFFICE O/P EST MOD 30 MIN: CPT

## 2021-02-23 PROCEDURE — 88112 CYTOPATH CELL ENHANCE TECH: CPT | Mod: 26

## 2021-02-28 ENCOUNTER — FORM ENCOUNTER (OUTPATIENT)
Age: 76
End: 2021-02-28

## 2021-02-28 LAB
APPEARANCE: CLEAR
BACTERIA UR CULT: NORMAL
BACTERIA: NEGATIVE
BILIRUBIN URINE: NEGATIVE
BLOOD URINE: NORMAL
COLOR: NORMAL
GLUCOSE QUALITATIVE U: ABNORMAL
HYALINE CASTS: 1 /LPF
KETONES URINE: NEGATIVE
LEUKOCYTE ESTERASE URINE: NEGATIVE
MICROSCOPIC-UA: NORMAL
NITRITE URINE: NEGATIVE
PH URINE: 6
PROTEIN URINE: NORMAL
RED BLOOD CELLS URINE: 3 /HPF
SPECIFIC GRAVITY URINE: 1.01
SQUAMOUS EPITHELIAL CELLS: 0 /HPF
URINE CYTOLOGY: NORMAL
UROBILINOGEN URINE: NORMAL
WHITE BLOOD CELLS URINE: 0 /HPF

## 2021-04-27 ENCOUNTER — APPOINTMENT (OUTPATIENT)
Dept: ORTHOPEDIC SURGERY | Facility: CLINIC | Age: 76
End: 2021-04-27
Payer: MEDICARE

## 2021-04-27 DIAGNOSIS — Z96.652 PRESENCE OF LEFT ARTIFICIAL KNEE JOINT: ICD-10-CM

## 2021-04-27 DIAGNOSIS — M17.12 UNILATERAL PRIMARY OSTEOARTHRITIS, LEFT KNEE: ICD-10-CM

## 2021-04-27 PROCEDURE — 99213 OFFICE O/P EST LOW 20 MIN: CPT | Mod: 95

## 2021-08-23 ENCOUNTER — APPOINTMENT (OUTPATIENT)
Dept: UROLOGY | Facility: CLINIC | Age: 76
End: 2021-08-23

## 2022-05-10 ENCOUNTER — APPOINTMENT (OUTPATIENT)
Dept: UROLOGY | Facility: CLINIC | Age: 77
End: 2022-05-10
Payer: MEDICARE

## 2022-05-10 VITALS
RESPIRATION RATE: 16 BRPM | TEMPERATURE: 97.5 F | OXYGEN SATURATION: 99 % | HEART RATE: 90 BPM | SYSTOLIC BLOOD PRESSURE: 134 MMHG | DIASTOLIC BLOOD PRESSURE: 85 MMHG

## 2022-05-10 PROCEDURE — 99214 OFFICE O/P EST MOD 30 MIN: CPT

## 2022-11-07 ENCOUNTER — NON-APPOINTMENT (OUTPATIENT)
Age: 77
End: 2022-11-07

## 2022-11-09 ENCOUNTER — APPOINTMENT (OUTPATIENT)
Dept: UROLOGY | Facility: CLINIC | Age: 77
End: 2022-11-09

## 2022-11-09 VITALS
OXYGEN SATURATION: 95 % | HEART RATE: 87 BPM | RESPIRATION RATE: 16 BRPM | DIASTOLIC BLOOD PRESSURE: 95 MMHG | TEMPERATURE: 97.9 F | SYSTOLIC BLOOD PRESSURE: 145 MMHG

## 2022-11-09 DIAGNOSIS — Z12.5 ENCOUNTER FOR SCREENING FOR MALIGNANT NEOPLASM OF PROSTATE: ICD-10-CM

## 2022-11-09 PROCEDURE — 99214 OFFICE O/P EST MOD 30 MIN: CPT

## 2022-11-09 NOTE — PHYSICAL EXAM
[General Appearance - Well Developed] : well developed [Normal Appearance] : normal appearance [Well Groomed] : well groomed [General Appearance - In No Acute Distress] : no acute distress [Abdomen Soft] : soft [Abdomen Tenderness] : non-tender [Edema] : no peripheral edema [] : no respiratory distress [Respiration, Rhythm And Depth] : normal respiratory rhythm and effort [Exaggerated Use Of Accessory Muscles For Inspiration] : no accessory muscle use [Oriented To Time, Place, And Person] : oriented to person, place, and time [Affect] : the affect was normal [Mood] : the mood was normal [Not Anxious] : not anxious [Normal Station and Gait] : the gait and station were normal for the patient's age [No Focal Deficits] : no focal deficits

## 2022-11-10 LAB
APPEARANCE: CLEAR
BACTERIA: NEGATIVE
BILIRUBIN URINE: NEGATIVE
BLOOD URINE: NEGATIVE
COLOR: COLORLESS
GLUCOSE QUALITATIVE U: NEGATIVE
HYALINE CASTS: 0 /LPF
KETONES URINE: NEGATIVE
LEUKOCYTE ESTERASE URINE: NEGATIVE
MICROSCOPIC-UA: NORMAL
NITRITE URINE: NEGATIVE
PH URINE: 6.5
PHOSPHATE SERPL-MCNC: 3.3 MG/DL
PROTEIN URINE: NEGATIVE
RED BLOOD CELLS URINE: 1 /HPF
SPECIFIC GRAVITY URINE: 1.01
SQUAMOUS EPITHELIAL CELLS: 0 /HPF
UROBILINOGEN URINE: NORMAL
WHITE BLOOD CELLS URINE: 0 /HPF

## 2022-11-12 LAB
ALBUMIN SERPL ELPH-MCNC: 4 G/DL
ANION GAP SERPL CALC-SCNC: 12 MMOL/L
BACTERIA UR CULT: NORMAL
BUN SERPL-MCNC: 25 MG/DL
CA-I SERPL-SCNC: 5.5 MG/DL
CALCIUM SERPL-MCNC: 10 MG/DL
CHLORIDE SERPL-SCNC: 100 MMOL/L
CO2 SERPL-SCNC: 24 MMOL/L
CREAT SERPL-MCNC: 1.33 MG/DL
EGFR: 55 ML/MIN/1.73M2
GLUCOSE SERPL-MCNC: 140 MG/DL
MAGNESIUM SERPL-MCNC: 2 MG/DL
OSMOLALITY SERPL: 293 MOSMOL/KG
POTASSIUM SERPL-SCNC: 4.1 MMOL/L
PSA SERPL-MCNC: 1.23 NG/ML
SODIUM SERPL-SCNC: 136 MMOL/L
URATE SERPL-MCNC: 6.7 MG/DL
URINE CYTOLOGY: NORMAL

## 2022-11-13 PROBLEM — Z12.5 ENCOUNTER FOR PROSTATE CANCER SCREENING: Status: ACTIVE | Noted: 2017-01-05

## 2022-11-13 NOTE — ADDENDUM
[FreeTextEntry1] : This note was authored by Gracy Pozo working as a scribe for Dr.Gary Flores. I, Dr. Isaac Flores have reviewed the content of this note and confirm it is true and accurate. I personally performed the history and physical examination and made all the decisions 11/09/2022

## 2022-11-13 NOTE — HISTORY OF PRESENT ILLNESS
[FreeTextEntry1] : 01/27/2021: Mr. Stevenson is a 74y/o M presenting today for evaluation of urinary frequency. Last seen in 2017. States that he will feel the need to urinate within 10-15 minutes after drinking water. Denies urinary urgency. Wakes up 2-4x at night to urinate. Notes he eats and drinks late at night. Denies gross hematuria, dysuria, pushing or straining. Urinates 5-6x during the day. No hx of kidney stones. Vitality is good. Had left knee replacement 2 years ago, and right knee replacement 3 years ago, and feels well. \par \par 02/23/2021: The patient presents today for a 2 week follow up. The patient has been taking tamsulosin 0.4 mg once daily which he reports he has seen some improvement in his symptoms but only slightly. Patient is doing well overall and expressed his appreciation for care. \par \par 05/10/2022:  presents today for a follow up. Still continues to take finasteride 5 mg once daily and tamsulosin 0.4 mg BID. Notes a substantial further improvement since the increase to BID in tamsulosin. No significant urinary difficulties. Not waking excessively at night to urinate. Denies urinary urgency, frequency, pushing, straining, gross hematuria, or dysuria. Denies lightheadedness or nasal congestion. He is doing well and is pleased with the improvement in his urination. \par \par 11/09/2022:  presents today for a follow up, accompanied by his wife. Has been having right flank pain for the past 8 days. Today the pain is 7/10 on pain scale. Pain has varied from 1-10 these past 8 days. Reports some urine that came out looking like coffee, indicating old blood. Has been taking advil for pain. Recently had a CT abdomen and pelvis (flank pain protocol) w/o contrast at [facility] on 11/5/2022. CT demonstrated a 6 mm mid right ureteral calculus, with mild hydronephrosis. 2 mm left renal calculus. Bilateral renal cysts. 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 Mild colonic diverticulosis. Pt has never had kidney stones before. States he used to drink a lot of water but lately has been only drinking about 2 water bottles a day, about a liter a day. Also drinks 1-1.5 cups of coffee in the morning and Organic Smooth Move tea every night to combat constipation. Pt has been taking finasteride 5 mg once daily for 6+ months and has not found it to show any improvement. No longer takes tamsulosin, is unclear why he stopped it. Has noticed the last few weeks he has been urinating more frequently. Denies burning on urination, pushing, and straining.

## 2022-11-13 NOTE — REVIEW OF SYSTEMS
[Nocturia] : nocturia [Constipation] : constipation [Flank Pain] : flank pain [Feeling Poorly] : not feeling poorly [Difficulty Walking] : no difficulty walking

## 2022-11-13 NOTE — ASSESSMENT
[FreeTextEntry1] : 01/27/2021: Mr. Stevenosn is a 77y/o M presenting today for evaluation of urinary frequency. Last seen in 2017. States that he will feel the need to urinate within 10-15 minutes after drinking water. Denies urinary urgency. Wakes up 2-4x at night to urinate. Notes he eats and drinks late at night. Denies gross hematuria, dysuria, pushing or straining. Urinates 5-6x during the day. No hx of kidney stones. Vitality is good. Had left knee replacement 2 years ago, and right knee replacement 3 years ago, and feels well. \par \par I prescribed the patient Tamsulosin 0.4 mg once daily half hour after a meal. I advised the patient the side effects of nasal congestion, light-headedness, and lack of seminal emission. \par \par Patient is unable to provide a urine sample today and will provide one at a later time. \par Blood work today included BMP, alkaline phosphatase, PSA, and testosterone. \par Pt declines rectal exam at this time, but we will reassess at the next visit. \par RTO in 2 weeks for reassessment. \par \par 02/23/2021: The patient presents today for a 2 week follow up. The patient has been taking tamsulosin 0.4 mg once daily which he reports he has seen some improvement in his symptoms but only slightly. Patient is doing well overall and expressed his appreciation for care. \par \par The patient was standing up and bent over for the LAZARA. LAZARA.Digital rectal exam found no suspicious rectal masses. Anal tone is normal.  It is a 30 gram transurethral resection size. No gross blood on the examining finger. Patient experienced pain that was from dilation of anal stenosis. Scar tissue seen from hemorrhoid operation in the past. \par \par I increased the dosage of Tamsulosin 0.4 mg from once daily to twice daily. \par The patient produced a urine sample which will be sent for urinalysis, urine cytology, and urine culture.\par Patient will RTO or have a telehealth appt in 2 weeks to access the efficacy and side effects of tamsulosin BID. \par \par 05/10/2022:  presents today for a follow up. Still continues to take finasteride 5 mg once daily and tamsulosin 0.4 mg BID. Notes a substantial further improvement since the increase to BID in tamsulosin. No significant urinary difficulties. Not waking excessively at night to urinate. Denies urinary urgency, frequency, pushing, straining, gross hematuria, or dysuria. Denies lightheadedness or nasal congestion. He is doing well and is pleased with the improvement in his urination. \par \par Renewed finasteride 5 mg once daily. Continue tamsulosin 0.4 mg BID. \par Patient will RTO in 6 months or sooner if clinically indicated. \par \par 11/09/2022:  presents today for a follow up, accompanied by his wife. Has been having right flank pain for the past 8 days. Today the pain is 7/10 on pain scale. Pain has varied from 1-10 these past 8 days. Reports some urine that came out looking like coffee, indicating old blood. Has been taking advil for pain. Recently had a CT abdomen and pelvis (flank pain protocol) w/o contrast at [facility] on 11/5/2022. CT demonstrated a 6 mm mid right ureteral calculus, with mild hydronephrosis. 2 mm left renal calculus. Bilateral renal cysts. 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 Mild colonic diverticulosis. Pt has never had kidney stones before. States he used to drink a lot of water but lately has been only drinking about 2 water bottles a day, about a liter a day. Also drinks 1-1.5 cups of coffee in the morning and Organic Smooth Move tea every night to combat constipation. Pt has been taking finasteride 5 mg once daily for 6+ months and has not found it to show any improvement. No longer takes tamsulosin, is unclear why he stopped it. Has noticed the last few weeks he has been urinating more frequently. Denies burning on urination, pushing, and straining. \par \par Clinical findings and CT results were reviewed at length with the patient and his wife. Pt informed this stone should pass on its own. Can continue to take tylenol or advil for the pain. Instructed to schedule a telehealth appt sooner if he feels he needs something stronger for the pain, but as of now he declines. \par \par The patient produced a urine sample which will be sent for urinalysis, urine cytology, and urine culture. \par \par Blood work today included serum albumin, BMP, ionized calcium, serum magnesium, serum osmolality, serum phosphorous, PSA, testosterone free and total, & uric acid serum. \par \par Strongly advised the patient to increase his water consumption to prevent kidney stone formation and growth. Can continue to drink smooth move tea for constipation but advised to cut back on coffee consumption. \par \par Pt can discontinue finasteride as it has not helped him. I am prescribing the pt Silodosin 8 mg once daily with food. I informed him of the possible side effects of lightheadedness, nasal congestion, and decreased amount of semen when ejaculating. Pt provided with a Good RX card. I informed the patient not only will this improve his urination but it will help the ureteral stone pass and reduce the pain. \par \par Pt provided with a strainer and specimen container and instructed on how to catch the stone if he passes it. Orders were entered for a stone analysis that he can drop off at is nearest [facility] lab. \par  \par Pt will go for a renal and bladder US in 3 weeks. Will keep scheduled appt time on 12/15/2022. Pt instructed to call back right away if he gets intolerable pain. \par \par Preparation, in person office visit, and coordination of care: 45 minutes.

## 2022-11-14 LAB
TESTOST FREE SERPL-MCNC: 7.4 PG/ML
TESTOST SERPL-MCNC: 475 NG/DL

## 2022-11-22 ENCOUNTER — APPOINTMENT (OUTPATIENT)
Dept: UROLOGY | Facility: CLINIC | Age: 77
End: 2022-11-22

## 2022-11-22 VITALS
HEIGHT: 66 IN | HEART RATE: 96 BPM | SYSTOLIC BLOOD PRESSURE: 170 MMHG | DIASTOLIC BLOOD PRESSURE: 81 MMHG | BODY MASS INDEX: 35.36 KG/M2 | WEIGHT: 220 LBS

## 2022-11-22 PROCEDURE — 99214 OFFICE O/P EST MOD 30 MIN: CPT

## 2022-11-22 NOTE — HISTORY OF PRESENT ILLNESS
[FreeTextEntry1] : Chencho Stevenson presents to the office today.  He is a 76-year-old man who is here with complaints of right-sided flank pain and nausea that was occurring overnight.  The patient has been having intermittent right-sided flank pain over the last several weeks.  He had seen Dr. Flores at the beginning of the month with the symptoms and had undergone a CT scan demonstrating the presence of a 6 mm right ureteral stone.  The patient had called our triage line earlier today asking for an urgent visit.  He was prescribed silodosin in the past to try to help stone passage.  He remains with periodic pain and discomfort.  There is no hematuria.  He denies fevers or chills.  He has had some nausea and vomiting.\par \par The patient has no other prior history of kidney stones.  \par

## 2022-11-22 NOTE — ASSESSMENT
[FreeTextEntry1] : I reviewed the patient's prior CT imaging.  I would recommend repeating a noncontrast CT scan to evaluate for any stone movement.  It is possible he may passed a 6 mm stone but have also explained that it may be lodged in its location within the ureter and may require surgical intervention to resolve the obstruction and his ongoing symptoms.  I did review ureteroscopy and laser lithotripsy with him so that he has an understanding of what could be involved should he need intervention.  I have also reviewed with him the option of undergoing urgent stent placement and advised him to proceed to the emergency room if he develops pain that is intolerable or if he has any fevers or chills.  Both of these would be indications for more urgent intervention.  He had asked for a prescription for tamsulosin instead of the silodosin he currently takes to try to help stone passage.  This was prescribed to him.\par \par At the end of the visit, the patient declined the follow-up CT scan.  He says he will take the tamsulosin and proceed with the emergency room visit should he develop any worsening symptoms or have persistent symptoms.  \par \par He will follow up with Dr. Flores.

## 2022-11-23 LAB
APPEARANCE: CLEAR
BACTERIA: NEGATIVE
BILIRUBIN URINE: NEGATIVE
BLOOD URINE: ABNORMAL
COLOR: NORMAL
GLUCOSE QUALITATIVE U: NORMAL
HYALINE CASTS: 0 /LPF
KETONES URINE: NEGATIVE
LEUKOCYTE ESTERASE URINE: NEGATIVE
MICROSCOPIC-UA: NORMAL
NITRITE URINE: NEGATIVE
PH URINE: 5.5
PROTEIN URINE: NORMAL
RED BLOOD CELLS URINE: 2 /HPF
SPECIFIC GRAVITY URINE: 1.01
SQUAMOUS EPITHELIAL CELLS: 0 /HPF
UROBILINOGEN URINE: NORMAL
WHITE BLOOD CELLS URINE: 1 /HPF

## 2022-11-28 ENCOUNTER — RESULT REVIEW (OUTPATIENT)
Age: 77
End: 2022-11-28

## 2022-11-30 LAB — BACTERIA UR CULT: NORMAL

## 2022-12-02 ENCOUNTER — OUTPATIENT (OUTPATIENT)
Dept: OUTPATIENT SERVICES | Facility: HOSPITAL | Age: 77
LOS: 1 days | End: 2022-12-02
Payer: MEDICARE

## 2022-12-02 ENCOUNTER — APPOINTMENT (OUTPATIENT)
Dept: ULTRASOUND IMAGING | Facility: CLINIC | Age: 77
End: 2022-12-02

## 2022-12-02 DIAGNOSIS — Z98.890 OTHER SPECIFIED POSTPROCEDURAL STATES: Chronic | ICD-10-CM

## 2022-12-02 DIAGNOSIS — N20.0 CALCULUS OF KIDNEY: ICD-10-CM

## 2022-12-02 DIAGNOSIS — R31.29 OTHER MICROSCOPIC HEMATURIA: ICD-10-CM

## 2022-12-02 DIAGNOSIS — N40.1 BENIGN PROSTATIC HYPERPLASIA WITH LOWER URINARY TRACT SYMPTOMS: ICD-10-CM

## 2022-12-02 DIAGNOSIS — Z90.89 ACQUIRED ABSENCE OF OTHER ORGANS: Chronic | ICD-10-CM

## 2022-12-02 DIAGNOSIS — Z96.651 PRESENCE OF RIGHT ARTIFICIAL KNEE JOINT: Chronic | ICD-10-CM

## 2022-12-02 DIAGNOSIS — Z90.49 ACQUIRED ABSENCE OF OTHER SPECIFIED PARTS OF DIGESTIVE TRACT: Chronic | ICD-10-CM

## 2022-12-02 PROCEDURE — 76770 US EXAM ABDO BACK WALL COMP: CPT

## 2022-12-02 PROCEDURE — 76770 US EXAM ABDO BACK WALL COMP: CPT | Mod: 26

## 2022-12-03 ENCOUNTER — APPOINTMENT (OUTPATIENT)
Dept: CT IMAGING | Facility: IMAGING CENTER | Age: 77
End: 2022-12-03

## 2022-12-03 ENCOUNTER — OUTPATIENT (OUTPATIENT)
Dept: OUTPATIENT SERVICES | Facility: HOSPITAL | Age: 77
LOS: 1 days | End: 2022-12-03
Payer: MEDICARE

## 2022-12-03 DIAGNOSIS — Z90.89 ACQUIRED ABSENCE OF OTHER ORGANS: Chronic | ICD-10-CM

## 2022-12-03 DIAGNOSIS — Z96.651 PRESENCE OF RIGHT ARTIFICIAL KNEE JOINT: Chronic | ICD-10-CM

## 2022-12-03 DIAGNOSIS — Z98.890 OTHER SPECIFIED POSTPROCEDURAL STATES: Chronic | ICD-10-CM

## 2022-12-03 DIAGNOSIS — Z90.49 ACQUIRED ABSENCE OF OTHER SPECIFIED PARTS OF DIGESTIVE TRACT: Chronic | ICD-10-CM

## 2022-12-03 DIAGNOSIS — Z00.8 ENCOUNTER FOR OTHER GENERAL EXAMINATION: ICD-10-CM

## 2022-12-03 DIAGNOSIS — N20.1 CALCULUS OF URETER: ICD-10-CM

## 2022-12-03 PROCEDURE — 74176 CT ABD & PELVIS W/O CONTRAST: CPT

## 2022-12-03 PROCEDURE — 74176 CT ABD & PELVIS W/O CONTRAST: CPT | Mod: 26

## 2022-12-14 ENCOUNTER — OUTPATIENT (OUTPATIENT)
Dept: OUTPATIENT SERVICES | Facility: HOSPITAL | Age: 77
LOS: 1 days | End: 2022-12-14

## 2022-12-14 VITALS
HEART RATE: 88 BPM | DIASTOLIC BLOOD PRESSURE: 88 MMHG | HEIGHT: 65 IN | RESPIRATION RATE: 16 BRPM | TEMPERATURE: 98 F | WEIGHT: 220.02 LBS | OXYGEN SATURATION: 96 % | SYSTOLIC BLOOD PRESSURE: 151 MMHG

## 2022-12-14 DIAGNOSIS — I10 ESSENTIAL (PRIMARY) HYPERTENSION: ICD-10-CM

## 2022-12-14 DIAGNOSIS — E11.9 TYPE 2 DIABETES MELLITUS WITHOUT COMPLICATIONS: ICD-10-CM

## 2022-12-14 DIAGNOSIS — Z96.652 PRESENCE OF LEFT ARTIFICIAL KNEE JOINT: Chronic | ICD-10-CM

## 2022-12-14 DIAGNOSIS — N20.0 CALCULUS OF KIDNEY: ICD-10-CM

## 2022-12-14 DIAGNOSIS — Z90.89 ACQUIRED ABSENCE OF OTHER ORGANS: Chronic | ICD-10-CM

## 2022-12-14 DIAGNOSIS — N20.1 CALCULUS OF URETER: ICD-10-CM

## 2022-12-14 DIAGNOSIS — Z98.890 OTHER SPECIFIED POSTPROCEDURAL STATES: Chronic | ICD-10-CM

## 2022-12-14 DIAGNOSIS — Z90.49 ACQUIRED ABSENCE OF OTHER SPECIFIED PARTS OF DIGESTIVE TRACT: Chronic | ICD-10-CM

## 2022-12-14 DIAGNOSIS — Z96.651 PRESENCE OF RIGHT ARTIFICIAL KNEE JOINT: Chronic | ICD-10-CM

## 2022-12-14 LAB
A1C WITH ESTIMATED AVERAGE GLUCOSE RESULT: 6.9 % — HIGH (ref 4–5.6)
ALBUMIN SERPL ELPH-MCNC: 4.3 G/DL — SIGNIFICANT CHANGE UP (ref 3.3–5)
ALP SERPL-CCNC: 56 U/L — SIGNIFICANT CHANGE UP (ref 40–120)
ALT FLD-CCNC: 16 U/L — SIGNIFICANT CHANGE UP (ref 4–41)
ANION GAP SERPL CALC-SCNC: 13 MMOL/L — SIGNIFICANT CHANGE UP (ref 7–14)
AST SERPL-CCNC: 22 U/L — SIGNIFICANT CHANGE UP (ref 4–40)
BILIRUB SERPL-MCNC: 0.4 MG/DL — SIGNIFICANT CHANGE UP (ref 0.2–1.2)
BUN SERPL-MCNC: 24 MG/DL — HIGH (ref 7–23)
CALCIUM SERPL-MCNC: 10.3 MG/DL — SIGNIFICANT CHANGE UP (ref 8.4–10.5)
CHLORIDE SERPL-SCNC: 104 MMOL/L — SIGNIFICANT CHANGE UP (ref 98–107)
CO2 SERPL-SCNC: 23 MMOL/L — SIGNIFICANT CHANGE UP (ref 22–31)
CREAT SERPL-MCNC: 1.21 MG/DL — SIGNIFICANT CHANGE UP (ref 0.5–1.3)
EGFR: 62 ML/MIN/1.73M2 — SIGNIFICANT CHANGE UP
ESTIMATED AVERAGE GLUCOSE: 151 — SIGNIFICANT CHANGE UP
GLUCOSE SERPL-MCNC: 108 MG/DL — HIGH (ref 70–99)
HCT VFR BLD CALC: 42.9 % — SIGNIFICANT CHANGE UP (ref 39–50)
HGB BLD-MCNC: 13.7 G/DL — SIGNIFICANT CHANGE UP (ref 13–17)
MCHC RBC-ENTMCNC: 28.5 PG — SIGNIFICANT CHANGE UP (ref 27–34)
MCHC RBC-ENTMCNC: 31.9 GM/DL — LOW (ref 32–36)
MCV RBC AUTO: 89.2 FL — SIGNIFICANT CHANGE UP (ref 80–100)
NRBC # BLD: 0 /100 WBCS — SIGNIFICANT CHANGE UP (ref 0–0)
NRBC # FLD: 0 K/UL — SIGNIFICANT CHANGE UP (ref 0–0)
PLATELET # BLD AUTO: 253 K/UL — SIGNIFICANT CHANGE UP (ref 150–400)
POTASSIUM SERPL-MCNC: 4.1 MMOL/L — SIGNIFICANT CHANGE UP (ref 3.5–5.3)
POTASSIUM SERPL-SCNC: 4.1 MMOL/L — SIGNIFICANT CHANGE UP (ref 3.5–5.3)
PROT SERPL-MCNC: 7.4 G/DL — SIGNIFICANT CHANGE UP (ref 6–8.3)
RBC # BLD: 4.81 M/UL — SIGNIFICANT CHANGE UP (ref 4.2–5.8)
RBC # FLD: 14.2 % — SIGNIFICANT CHANGE UP (ref 10.3–14.5)
SODIUM SERPL-SCNC: 140 MMOL/L — SIGNIFICANT CHANGE UP (ref 135–145)
WBC # BLD: 6.42 K/UL — SIGNIFICANT CHANGE UP (ref 3.8–10.5)
WBC # FLD AUTO: 6.42 K/UL — SIGNIFICANT CHANGE UP (ref 3.8–10.5)

## 2022-12-14 PROCEDURE — 93010 ELECTROCARDIOGRAM REPORT: CPT

## 2022-12-14 NOTE — H&P PST ADULT - NSANTHOSAYNRD_GEN_A_CORE
"snores sometimes"/No. GERBER screening performed.  STOP BANG Legend: 0-2 = LOW Risk; 3-4 = INTERMEDIATE Risk; 5-8 = HIGH Risk

## 2022-12-14 NOTE — H&P PST ADULT - NSICDXPASTSURGICALHX_GEN_ALL_CORE_FT
PAST SURGICAL HISTORY:  H/O elbow surgery left ~1982    H/O total knee replacement, left     H/O total knee replacement, right Nov 29, 2017    History of appendectomy 19 years old    History of tonsillectomy at 42 years old

## 2022-12-14 NOTE — H&P PST ADULT - PROBLEM SELECTOR PLAN 1
Pt. is scheduled for a right ureteroscopy laser lithotripsy...12/27/22.  Pt. verbalized understanding of instructions.

## 2022-12-14 NOTE — H&P PST ADULT - PROBLEM SELECTOR PLAN 3
Repeat 160/90, pt. is asymptomatic.  Instructed pt. to decrease sodium intake.  Pt. was instructed to have medical clearance by the Surgeon.  Will have it faxed to PST.

## 2022-12-15 ENCOUNTER — APPOINTMENT (OUTPATIENT)
Dept: UROLOGY | Facility: CLINIC | Age: 77
End: 2022-12-15

## 2022-12-15 LAB
CULTURE RESULTS: SIGNIFICANT CHANGE UP
SPECIMEN SOURCE: SIGNIFICANT CHANGE UP

## 2022-12-26 ENCOUNTER — TRANSCRIPTION ENCOUNTER (OUTPATIENT)
Age: 77
End: 2022-12-26

## 2022-12-27 ENCOUNTER — TRANSCRIPTION ENCOUNTER (OUTPATIENT)
Age: 77
End: 2022-12-27

## 2022-12-27 ENCOUNTER — APPOINTMENT (OUTPATIENT)
Dept: UROLOGY | Facility: AMBULATORY SURGERY CENTER | Age: 77
End: 2022-12-27

## 2022-12-27 ENCOUNTER — OUTPATIENT (OUTPATIENT)
Dept: OUTPATIENT SERVICES | Facility: HOSPITAL | Age: 77
LOS: 1 days | Discharge: HOME | End: 2022-12-27

## 2022-12-27 VITALS
OXYGEN SATURATION: 99 % | DIASTOLIC BLOOD PRESSURE: 67 MMHG | RESPIRATION RATE: 16 BRPM | SYSTOLIC BLOOD PRESSURE: 134 MMHG | HEART RATE: 72 BPM

## 2022-12-27 VITALS
SYSTOLIC BLOOD PRESSURE: 171 MMHG | HEART RATE: 93 BPM | TEMPERATURE: 99 F | OXYGEN SATURATION: 97 % | RESPIRATION RATE: 18 BRPM | DIASTOLIC BLOOD PRESSURE: 88 MMHG | WEIGHT: 220.02 LBS | HEIGHT: 65 IN

## 2022-12-27 DIAGNOSIS — Z96.652 PRESENCE OF LEFT ARTIFICIAL KNEE JOINT: Chronic | ICD-10-CM

## 2022-12-27 DIAGNOSIS — N20.1 CALCULUS OF URETER: ICD-10-CM

## 2022-12-27 DIAGNOSIS — Z98.890 OTHER SPECIFIED POSTPROCEDURAL STATES: Chronic | ICD-10-CM

## 2022-12-27 DIAGNOSIS — Z96.651 PRESENCE OF RIGHT ARTIFICIAL KNEE JOINT: Chronic | ICD-10-CM

## 2022-12-27 DIAGNOSIS — Z90.49 ACQUIRED ABSENCE OF OTHER SPECIFIED PARTS OF DIGESTIVE TRACT: Chronic | ICD-10-CM

## 2022-12-27 DIAGNOSIS — Z90.89 ACQUIRED ABSENCE OF OTHER ORGANS: Chronic | ICD-10-CM

## 2022-12-27 PROCEDURE — 52356 CYSTO/URETERO W/LITHOTRIPSY: CPT | Mod: RT

## 2022-12-27 DEVICE — URETERAL STENT PERCUFLEX PLUS 6FR 26CM: Type: IMPLANTABLE DEVICE | Site: RIGHT | Status: FUNCTIONAL

## 2022-12-27 DEVICE — STONE BASKET ZEROTIP NITINOL 4-WIRE 1.9FR 120CM X 12MM: Type: IMPLANTABLE DEVICE | Site: RIGHT | Status: FUNCTIONAL

## 2022-12-27 DEVICE — URETERAL CATH OPEN END 5FR 70CM: Type: IMPLANTABLE DEVICE | Site: RIGHT | Status: FUNCTIONAL

## 2022-12-27 DEVICE — IMPLANTABLE DEVICE: Type: IMPLANTABLE DEVICE | Site: RIGHT | Status: FUNCTIONAL

## 2022-12-27 DEVICE — URETERAL SHEATH NAVIGATOR HD 12/14FR X 28CM: Type: IMPLANTABLE DEVICE | Site: RIGHT | Status: FUNCTIONAL

## 2022-12-27 DEVICE — GUIDEWIRE SENSOR DUAL-FLEX NITINOL STRAIGHT .038" X 150CM: Type: IMPLANTABLE DEVICE | Site: RIGHT | Status: FUNCTIONAL

## 2022-12-27 DEVICE — LASER FIBER SOLTIVE 200: Type: IMPLANTABLE DEVICE | Site: RIGHT | Status: FUNCTIONAL

## 2022-12-27 RX ORDER — CEFPODOXIME PROXETIL 100 MG
1 TABLET ORAL
Qty: 6 | Refills: 0
Start: 2022-12-27 | End: 2022-12-29

## 2022-12-27 RX ORDER — SODIUM CHLORIDE 9 MG/ML
1000 INJECTION, SOLUTION INTRAVENOUS
Refills: 0 | Status: DISCONTINUED | OUTPATIENT
Start: 2022-12-27 | End: 2023-01-10

## 2022-12-27 NOTE — ASU DISCHARGE PLAN (ADULT/PEDIATRIC) - ASU DC SPECIAL INSTRUCTIONSFT
STENT: You may have an internal stent (a hollow tube that runs from the kidney to your bladder) after your procedure, which helps urine drain from the kidney to your bladder. Some patients experience urinary frequency, burning, or even back pain (especially with urination). These sensations will gradually get better. Increasing your fluid intake can also improve these symptoms. While the stent is in place, your urine may continue to be bloody. This stent is temporary and must be removed by your urologist as an outpatient.     GENERAL: It is common to have blood in your urine after your procedure. It may be pink or even red; inform your doctor if you have a significant amount of clot in the urine or if you are unable to void at all. The urine may clear and then become bloody again especially as you are more physically active.    BATHING: You may shower or bathe.    DIET: You may resume your regular diet and regular medication regimen.    PAIN: You may take Tylenol (acetaminophen) 650-975mg and/or Motrin (ibuprofen) 400-600mg, both available over the counter, for pain every 6 hours as needed. Do not exceed 4000mg of Tylenol (acetaminophen) daily. You may alternate these medications such that you take one or the other every 3 hours for around the clock pain coverage.     ANTIBIOTICS: You have been given a prescription for an antibiotic, sent to your pharmacy, please take this medication as instructed and be sure to complete the entire course.    STOOL SOFTENERS: Do not allow yourself to become constipated as straining may cause bleeding. Take stool softeners or a laxative (ex. Miralax, Colace, Senokot, ExLax, etc), available over the counter, if needed.    ACTIVITY: No heavy lifting or strenuous exercise until you are evaluated at your post-operative appointment. Otherwise, you may return to your usual level of physical activity.    FOLLOW-UP: If you did not already schedule your post-operative appointment, please call your urologist to schedule a follow-up appointment. Return to the office in 1 week for stent removal with Dr. Kulkarni.    CALL YOUR UROLOGIST IF: You have any bleeding that does not stop, inability to void >8 hours, fever over 100.4 F, chills, persistent nausea/vomiting, changes in your incision concerning for infection, or if your pain is not controlled on your discharge pain medications.

## 2022-12-27 NOTE — ASU PREOPERATIVE ASSESSMENT, ADULT ([facility] ONLY) - FALL HARM RISK - UNIVERSAL INTERVENTIONS
Bed in lowest position, wheels locked, appropriate side rails in place/Call bell, personal items and telephone in reach/Instruct patient to call for assistance before getting out of bed or chair/Non-slip footwear when patient is out of bed/[place] to call system/Physically safe environment - no spills, clutter or unnecessary equipment/Purposeful Proactive Rounding/Room/bathroom lighting operational, light cord in reach

## 2022-12-27 NOTE — BRIEF OPERATIVE NOTE - OPERATION/FINDINGS
cystoscopy, right ureteroscopy with semirigid ureteroscope, laser lithotripsy, and right ureteral stent insertion

## 2022-12-27 NOTE — ASU DISCHARGE PLAN (ADULT/PEDIATRIC) - NS MD DC FALL RISK RISK
For information on Fall & Injury Prevention, visit: [email or web address] OR  [email or web address] OR  https://www.cdc.gov/steadi/patient.html

## 2022-12-27 NOTE — BRIEF OPERATIVE NOTE - NSICDXBRIEFPROCEDURE_GEN_ALL_CORE_FT
PROCEDURES:  Cystoscopy with ureteroscopy, laser lithotripsy, and insertion of indwelling ureteral stent 27-Dec-2022 13:15:27 right ureteroscopy laser lithotripsy and right ureteral stent insertion Patric Miller

## 2023-01-02 RX ORDER — CHOLECALCIFEROL (VITAMIN D3) 125 MCG
1 CAPSULE ORAL
Qty: 0 | Refills: 0 | DISCHARGE

## 2023-01-02 RX ORDER — METFORMIN HYDROCHLORIDE 850 MG/1
1 TABLET ORAL
Qty: 0 | Refills: 0 | DISCHARGE

## 2023-01-02 RX ORDER — TAMSULOSIN HYDROCHLORIDE 0.4 MG/1
1 CAPSULE ORAL
Qty: 0 | Refills: 0 | DISCHARGE

## 2023-01-02 RX ORDER — SIMVASTATIN 20 MG/1
1 TABLET, FILM COATED ORAL
Qty: 0 | Refills: 0 | DISCHARGE

## 2023-01-02 RX ORDER — ASPIRIN/CALCIUM CARB/MAGNESIUM 324 MG
1 TABLET ORAL
Qty: 0 | Refills: 0 | DISCHARGE

## 2023-01-02 RX ORDER — PREGABALIN 225 MG/1
1 CAPSULE ORAL
Qty: 0 | Refills: 0 | DISCHARGE

## 2023-01-03 ENCOUNTER — APPOINTMENT (OUTPATIENT)
Dept: UROLOGY | Facility: CLINIC | Age: 78
End: 2023-01-03
Payer: MEDICARE

## 2023-01-03 ENCOUNTER — OUTPATIENT (OUTPATIENT)
Dept: OUTPATIENT SERVICES | Facility: HOSPITAL | Age: 78
LOS: 1 days | End: 2023-01-03
Payer: COMMERCIAL

## 2023-01-03 DIAGNOSIS — Z90.89 ACQUIRED ABSENCE OF OTHER ORGANS: Chronic | ICD-10-CM

## 2023-01-03 DIAGNOSIS — Z96.652 PRESENCE OF LEFT ARTIFICIAL KNEE JOINT: Chronic | ICD-10-CM

## 2023-01-03 DIAGNOSIS — N20.1 CALCULUS OF URETER: ICD-10-CM

## 2023-01-03 DIAGNOSIS — Z90.49 ACQUIRED ABSENCE OF OTHER SPECIFIED PARTS OF DIGESTIVE TRACT: Chronic | ICD-10-CM

## 2023-01-03 DIAGNOSIS — Z96.651 PRESENCE OF RIGHT ARTIFICIAL KNEE JOINT: Chronic | ICD-10-CM

## 2023-01-03 DIAGNOSIS — Z98.890 OTHER SPECIFIED POSTPROCEDURAL STATES: Chronic | ICD-10-CM

## 2023-01-03 PROCEDURE — 52310 CYSTOSCOPY AND TREATMENT: CPT | Mod: RT

## 2023-01-03 PROCEDURE — 99213 OFFICE O/P EST LOW 20 MIN: CPT | Mod: 25

## 2023-01-03 PROCEDURE — 52310 CYSTOSCOPY AND TREATMENT: CPT

## 2023-01-03 NOTE — HISTORY OF PRESENT ILLNESS
[FreeTextEntry1] : Vivek Stevenson returns to the office today.  He is a 77-year-old man recently status post right ureteroscopy with laser lithotripsy and stent insertion to manage an obstructing right ureteral stone in the mid right ureter.  He is back today for stent removal and routine follow-up discussion.  He has been doing well since the procedure.  He had a day or 2 of feeling more significant urinary urgency and frequency but is now feeling well and back to his baseline.  He denies any residual urinary complaints or any hematuria.  He also denies any postoperative fevers or chills.  He has been on tamsulosin previously to try to help pass the stone before the procedure.

## 2023-01-03 NOTE — ASSESSMENT
[FreeTextEntry1] : I performed a cystoscopy today and removed the right ureteral stent.  It was removed completely intact and the patient tolerated the procedure well.  I advised him that he may passed some sediment after the stent removal and any stent related symptoms should resolve fairly quickly.  If he does have any persistent issues of dysuria, urgency or frequency advised him to contact me for urine culture to reassess.\par \par Moving forward he will follow-up with Dr. Flores for renal sonogram to reassess the kidneys in the future.  I told him I believe that Dr. Flores will likely do this study for him in about 6 months.

## 2023-01-04 DIAGNOSIS — N20.1 CALCULUS OF URETER: ICD-10-CM

## 2023-01-05 LAB — NIDUS STONE QN: SIGNIFICANT CHANGE UP

## 2023-01-09 PROBLEM — E78.5 HYPERLIPIDEMIA, UNSPECIFIED: Chronic | Status: ACTIVE | Noted: 2020-11-23

## 2023-01-09 PROBLEM — E66.9 OBESITY, UNSPECIFIED: Chronic | Status: ACTIVE | Noted: 2020-11-23

## 2023-01-09 PROBLEM — R73.03 PREDIABETES: Chronic | Status: ACTIVE | Noted: 2020-11-23

## 2023-01-09 PROBLEM — M25.562 PAIN IN LEFT KNEE: Chronic | Status: ACTIVE | Noted: 2020-11-23

## 2023-05-11 NOTE — OCCUPATIONAL THERAPY INITIAL EVALUATION ADULT - SITTING BALANCE: DYNAMIC
Quality 130: Documentation Of Current Medications In The Medical Record: Current Medications Documented Detail Level: Detailed Quality 110: Preventive Care And Screening: Influenza Immunization: Influenza Immunization Administered during Influenza season good balance

## 2023-06-07 ENCOUNTER — APPOINTMENT (OUTPATIENT)
Dept: UROLOGY | Facility: CLINIC | Age: 78
End: 2023-06-07
Payer: MEDICARE

## 2023-06-07 PROCEDURE — 99215 OFFICE O/P EST HI 40 MIN: CPT | Mod: 25

## 2023-06-07 PROCEDURE — 76775 US EXAM ABDO BACK WALL LIM: CPT

## 2023-06-11 NOTE — HISTORY OF PRESENT ILLNESS
[FreeTextEntry1] : 01/27/2021: Mr. Celis is a 76y/o M presenting today for evaluation of urinary frequency. Last seen in 2017. States that he will feel the need to urinate within 10-15 minutes after drinking water. Denies urinary urgency. Wakes up 2-4x at night to urinate. Notes he eats and drinks late at night. Denies gross hematuria, dysuria, pushing or straining. Urinates 5-6x during the day. No hx of kidney stones. Vitality is good. Had left knee replacement 2 years ago, and right knee replacement 3 years ago, and feels well. \par \par 02/23/2021: The patient presents today for a 2 week follow up. The patient has been taking tamsulosin 0.4 mg once daily which he reports he has seen some improvement in his symptoms but only slightly. Patient is doing well overall and expressed his appreciation for care. \par \par 05/10/2022:  presents today for a follow up. Still continues to take finasteride 5 mg once daily and tamsulosin 0.4 mg BID. Notes a substantial further improvement since the increase to BID in tamsulosin. No significant urinary difficulties. Not waking excessively at night to urinate. Denies urinary urgency, frequency, pushing, straining, gross hematuria, or dysuria. Denies lightheadedness or nasal congestion. He is doing well and is pleased with the improvement in his urination. \par \par 11/09/2022:  presents today for a follow up, accompanied by his wife. Has been having right flank pain for the past 8 days. Today the pain is 7/10 on pain scale. Pain has varied from 1-10 these past 8 days. Reports some urine that came out looking like coffee, indicating old blood. Has been taking advil for pain. Recently had a CT abdomen and pelvis (flank pain protocol) w/o contrast at [facility] on 11/5/2022. CT demonstrated a 6 mm mid right ureteral calculus, with mild hydronephrosis. 2 mm left renal calculus. Bilateral renal cysts. 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 Mild colonic diverticulosis. Pt has never had kidney stones before. States he used to drink a lot of water but lately has been only drinking about 2 water bottles a day, about a liter a day. Also drinks 1-1.5 cups of coffee in the morning and Organic Smooth Move tea every night to combat constipation. Pt has been taking finasteride 5 mg once daily for 6+ months and has not found it to show any improvement. No longer takes tamsulosin, is unclear why he stopped it. Has noticed the last few weeks he has been urinating more frequently. Denies burning on urination, pushing, and straining. \par \par 06/07/2023: Mr. ALMA CELIS presents today for a renal US. Pt underwent a right ureteroscopy, laser lithotripsy, basket stone extraction and insertion of right ureteral double-J stent on 12/27/2022 with Dr.Michael Kulkarni. Renal US today demonstrated there are bilateral echogenic foci with twinkle artifact , in the right kidney lower pole 3.8 mm and in the left kidney midpole 6.5 mm. There are bilateral simple cysts , in the right kidney lower pole 9.7 cm x 8.5 cm x 9.4 cm and in the upper pole 4.1 cm x 4.1 cm x 4.8 cm and in the left kidney lower pole 6.3 cm x 7.0 cm x 7.2 cm and in the upper pole 5.2 cm x 5.9 cm x 5.4 cm. Both kidneys are normal in size and echogenicity without hydronephrosis or solid masses visualized. He reports that he is feeling very well. Nocturia x1-3. Occasional urinary urgency and urge incontinence. Has tried finasteride, tamsulosin, and silodosin w/o success in the past. Is trying to drink a lot of liquids, however upon questioning how much he informs me he drinks about 2 750 ml glasses of water. Denies constipation. Patient admits ED, however his wife is not interested.

## 2023-06-11 NOTE — ADDENDUM
[FreeTextEntry1] : This note was authored by Gracy Pozo working as a scribe for Dr.Gary Flores. I, Dr. Isaac Flores have reviewed the content of this note and confirm it is true and accurate. I personally performed the history and physical examination and made all the decisions 06/07/2023

## 2023-06-11 NOTE — REVIEW OF SYSTEMS
[Constipation] : constipation [Nocturia] : nocturia [Flank Pain] : flank pain [Erectile Dysfunction] : erectile dysfunction [Feeling Poorly] : not feeling poorly [Difficulty Walking] : no difficulty walking

## 2023-06-11 NOTE — ASSESSMENT
[FreeTextEntry1] : 01/27/2021: Mr. Celis is a 78y/o M presenting today for evaluation of urinary frequency. Last seen in 2017. States that he will feel the need to urinate within 10-15 minutes after drinking water. Denies urinary urgency. Wakes up 2-4x at night to urinate. Notes he eats and drinks late at night. Denies gross hematuria, dysuria, pushing or straining. Urinates 5-6x during the day. No hx of kidney stones. Vitality is good. Had left knee replacement 2 years ago, and right knee replacement 3 years ago, and feels well. \par \par I prescribed the patient Tamsulosin 0.4 mg once daily half hour after a meal. I advised the patient the side effects of nasal congestion, light-headedness, and lack of seminal emission. \par \par Patient is unable to provide a urine sample today and will provide one at a later time. \par Blood work today included BMP, alkaline phosphatase, PSA, and testosterone. \par Pt declines rectal exam at this time, but we will reassess at the next visit. \par RTO in 2 weeks for reassessment. \par \par 02/23/2021: The patient presents today for a 2 week follow up. The patient has been taking tamsulosin 0.4 mg once daily which he reports he has seen some improvement in his symptoms but only slightly. Patient is doing well overall and expressed his appreciation for care. \par \par The patient was standing up and bent over for the LAZARA. LAZARA.Digital rectal exam found no suspicious rectal masses. Anal tone is normal.  It is a 30 gram transurethral resection size. No gross blood on the examining finger. Patient experienced pain that was from dilation of anal stenosis. Scar tissue seen from hemorrhoid operation in the past. \par \par I increased the dosage of Tamsulosin 0.4 mg from once daily to twice daily. \par The patient produced a urine sample which will be sent for urinalysis, urine cytology, and urine culture.\par Patient will RTO or have a telehealth appt in 2 weeks to access the efficacy and side effects of tamsulosin BID. \par \par 05/10/2022:  presents today for a follow up. Still continues to take finasteride 5 mg once daily and tamsulosin 0.4 mg BID. Notes a substantial further improvement since the increase to BID in tamsulosin. No significant urinary difficulties. Not waking excessively at night to urinate. Denies urinary urgency, frequency, pushing, straining, gross hematuria, or dysuria. Denies lightheadedness or nasal congestion. He is doing well and is pleased with the improvement in his urination. \par \par Renewed finasteride 5 mg once daily. Continue tamsulosin 0.4 mg BID. \par Patient will RTO in 6 months or sooner if clinically indicated. \par \par 11/09/2022:  presents today for a follow up, accompanied by his wife. Has been having right flank pain for the past 8 days. Today the pain is 7/10 on pain scale. Pain has varied from 1-10 these past 8 days. Reports some urine that came out looking like coffee, indicating old blood. Has been taking advil for pain. Recently had a CT abdomen and pelvis (flank pain protocol) w/o contrast at [facility] on 11/5/2022. CT demonstrated a 6 mm mid right ureteral calculus, with mild hydronephrosis. 2 mm left renal calculus. Bilateral renal cysts. 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 Mild colonic diverticulosis. Pt has never had kidney stones before. States he used to drink a lot of water but lately has been only drinking about 2 water bottles a day, about a liter a day. Also drinks 1-1.5 cups of coffee in the morning and Organic Smooth Move tea every night to combat constipation. Pt has been taking finasteride 5 mg once daily for 6+ months and has not found it to show any improvement. No longer takes tamsulosin, is unclear why he stopped it. Has noticed the last few weeks he has been urinating more frequently. Denies burning on urination, pushing, and straining. \par \par Clinical findings and CT results were reviewed at length with the patient and his wife. Pt informed this stone should pass on its own. Can continue to take tylenol or advil for the pain. Instructed to schedule a telehealth appt sooner if he feels he needs something stronger for the pain, but as of now he declines. \par The patient produced a urine sample which will be sent for urinalysis, urine cytology, and urine culture. \par Blood work today included serum albumin, BMP, ionized calcium, serum magnesium, serum osmolality, serum phosphorous, PSA, testosterone free and total, & uric acid serum. \par Strongly advised the patient to increase his water consumption to prevent kidney stone formation and growth. Can continue to drink smooth move tea for constipation but advised to cut back on coffee consumption. \par Pt can discontinue finasteride as it has not helped him. I am prescribing the pt Silodosin 8 mg once daily with food. I informed him of the possible side effects of lightheadedness, nasal congestion, and decreased amount of semen when ejaculating. Pt provided with a Good RX card. I informed the patient not only will this improve his urination but it will help the ureteral stone pass and reduce the pain. \par Pt provided with a strainer and specimen container and instructed on how to catch the stone if he passes it. Orders were entered for a stone analysis that he can drop off at is nearest [facility] lab. \par Pt will go for a renal and bladder US in 3 weeks. Will keep scheduled appt time on 12/15/2022. Pt instructed to call back right away if he gets intolerable pain. \par \[facility] 06/07/2023: Mr. ALMA CELIS presents today for a renal US. Pt underwent a right ureteroscopy, laser lithotripsy, basket stone extraction and insertion of right ureteral double-J stent on 12/27/2022 with Dr.Michael Kulkarni. Renal US today demonstrated there are bilateral echogenic foci with twinkle artifact , in the right kidney lower pole 3.8 mm and in the left kidney midpole 6.5 mm. There are bilateral simple cysts , in the right kidney lower pole 9.7 cm x 8.5 cm x 9.4 cm and in the upper pole 4.1 cm x 4.1 cm x 4.8 cm and in the left kidney lower pole 6.3 cm x 7.0 cm x 7.2 cm and in the upper pole 5.2 cm x 5.9 cm x 5.4 cm. Both kidneys are normal in size and echogenicity without hydronephrosis or solid masses visualized. He reports that he is feeling very well. Nocturia x1-3. Occasional urinary urgency and urge incontinence. Has tried finasteride, tamsulosin, and silodosin w/o success in the past. Is trying to drink a lot of liquids, however upon questioning how much he informs me he drinks about 2 750 ml glasses of water. Denies constipation. Patient admits ED, however his wife is not interested. \par \par The standing and bent over position was utilized for the LAZARA as he has knee problems. Digital rectal exam found no suspicious rectal masses. Normal seminal vesicles. Anal tone is tight. The prostate is non tender, with normal texture, discrete borders, and no nodules. It is a 30 gram transurethral resection size, though the exam was limited. No rectal mucosal lesions. No gross blood on the examining finger. Post surgical scar from hemorrhoidectomy which made the exam uncomfortable. \par \par Clinical findings and US results were reviewed at length with the patient. I personally reviewed the imaging myself. The right stone is about the same size, however the left stone has grown some. Pt will complete a 24 hr LithoLink urine collection, form was provided. Instructions were reviewed with the patient and his wife. When the results come back we will discuss and I can suggest lifestyle modifications to reduce the risk of kidney stone growth and formation. For now, patient was strongly advised to increase his water intake further. \par \par The patient produced a urine sample which will be sent for urinalysis, urine cytology, and urine culture. \par \par Blood work today included BMP, alkaline phosphatase, PSA, and testosterone. \par \par I prescribed the patient oxybutynin ER 5 mg once daily.  I informed the patient and his wife that this medication can sometimes cause constipation and dry mouth. I advised him to eat fruits and vegetables, drink lots of water, and if it becomes severe take a stool softener. For the dry mouth I advised him to used Biotene artificial saliva. \par \par Patient will either RTO or schedule a telehealth visit about 2 weeks after completing the Litholink to review and discuss results as well as discuss oxybutynin. \par \par Preparation, in person office visit excluding time for renal ultrasound, and coordination of care: 45 minutes.

## 2023-06-11 NOTE — PHYSICAL EXAM
[General Appearance - Well Developed] : well developed [Normal Appearance] : normal appearance [Well Groomed] : well groomed [General Appearance - In No Acute Distress] : no acute distress [Abdomen Soft] : soft [Abdomen Tenderness] : non-tender [Edema] : no peripheral edema [] : no respiratory distress [Respiration, Rhythm And Depth] : normal respiratory rhythm and effort [Exaggerated Use Of Accessory Muscles For Inspiration] : no accessory muscle use [Oriented To Time, Place, And Person] : oriented to person, place, and time [Affect] : the affect was normal [Mood] : the mood was normal [Not Anxious] : not anxious [Normal Station and Gait] : the gait and station were normal for the patient's age [No Focal Deficits] : no focal deficits [FreeTextEntry1] : The standing and bent over position was utilized for the LAZARA as he has knee problems. Digital rectal exam found no suspicious rectal masses. Normal seminal vesicles. Anal tone is tight. The prostate is non tender, with normal texture, discrete borders, and no nodules. It is a 30 gram transurethral resection size, though the exam was limited. No rectal mucosal lesions. No gross blood on the examining finger. Post surgical scar from hemorrhoidectomy which made the exam uncomfortable.  normal...

## 2023-06-18 DIAGNOSIS — R79.0 ABNORMAL LVL OF BLOOD MINERAL: ICD-10-CM

## 2023-06-18 LAB
ALBUMIN SERPL ELPH-MCNC: 4.6 G/DL
ALP BLD-CCNC: 51 U/L
ANION GAP SERPL CALC-SCNC: 16 MMOL/L
APPEARANCE: CLEAR
BACTERIA UR CULT: NORMAL
BACTERIA: NEGATIVE /HPF
BILIRUBIN URINE: NEGATIVE
BLOOD URINE: ABNORMAL
BUN SERPL-MCNC: 17 MG/DL
CALCIUM SERPL-MCNC: 10.2 MG/DL
CAST: 0 /LPF
CHLORIDE SERPL-SCNC: 104 MMOL/L
CO2 SERPL-SCNC: 21 MMOL/L
COLOR: YELLOW
CREAT SERPL-MCNC: 0.69 MG/DL
CYSTATIN C SERPL-MCNC: 0.96 MG/L
EGFR: 95 ML/MIN/1.73M2
EPITHELIAL CELLS: 0 /HPF
GFR/BSA.PRED SERPLBLD CYS-BASED-ARV: 77 ML/MIN/1.73M2
GLUCOSE QUALITATIVE U: 100 MG/DL
GLUCOSE SERPL-MCNC: 144 MG/DL
KETONES URINE: NEGATIVE MG/DL
LEUKOCYTE ESTERASE URINE: NEGATIVE
MICROSCOPIC-UA: NORMAL
NITRITE URINE: NEGATIVE
PH URINE: 7
PHOSPHATE SERPL-MCNC: 2.3 MG/DL
POTASSIUM SERPL-SCNC: 4.2 MMOL/L
PROTEIN URINE: 30 MG/DL
PSA SERPL-MCNC: 2.11 NG/ML
RED BLOOD CELLS URINE: 3 /HPF
SODIUM SERPL-SCNC: 140 MMOL/L
SPECIFIC GRAVITY URINE: 1.02
TESTOST FREE SERPL-MCNC: 4.9 PG/ML
TESTOST SERPL-MCNC: 413 NG/DL
URINE CYTOLOGY: NORMAL
UROBILINOGEN URINE: 0.2 MG/DL
WHITE BLOOD CELLS URINE: 0 /HPF

## 2023-06-21 ENCOUNTER — NON-APPOINTMENT (OUTPATIENT)
Age: 78
End: 2023-06-21

## 2023-06-24 LAB
APPEARANCE: CLEAR
BACTERIA: NEGATIVE /HPF
BILIRUBIN URINE: NEGATIVE
BLOOD URINE: ABNORMAL
CAST: 0 /LPF
COLOR: YELLOW
EPITHELIAL CELLS: 0 /HPF
ESTIMATED AVERAGE GLUCOSE: 154 MG/DL
ESTRADIOL SERPL-MCNC: 34 PG/ML
GLUCOSE QUALITATIVE U: NEGATIVE MG/DL
HBA1C MFR BLD HPLC: 7 %
KETONES URINE: NEGATIVE MG/DL
LEUKOCYTE ESTERASE URINE: NEGATIVE
MICROSCOPIC-UA: NORMAL
NITRITE URINE: NEGATIVE
PH URINE: 6.5
PHOSPHATE SERPL-MCNC: 2.8 MG/DL
PROTEIN URINE: NORMAL MG/DL
PSA FREE FLD-MCNC: 24 %
PSA FREE SERPL-MCNC: 0.55 NG/ML
PSA SERPL-MCNC: 2.25 NG/ML
RED BLOOD CELLS URINE: 2 /HPF
SHBG SERPL-SCNC: 55.2 NMOL/L
SPECIFIC GRAVITY URINE: 1.01
TESTOST FREE SERPL-MCNC: 4.8 PG/ML
TESTOST SERPL-MCNC: 439 NG/DL
UROBILINOGEN URINE: 0.2 MG/DL
WHITE BLOOD CELLS URINE: 0 /HPF

## 2023-06-26 LAB — URINE CYTOLOGY: NORMAL

## 2023-06-27 ENCOUNTER — NON-APPOINTMENT (OUTPATIENT)
Age: 78
End: 2023-06-27

## 2023-06-27 LAB — ANDROSTANOLONE SERPL-MCNC: 2.9 NG/DL

## 2023-06-28 ENCOUNTER — APPOINTMENT (OUTPATIENT)
Dept: UROLOGY | Facility: CLINIC | Age: 78
End: 2023-06-28
Payer: MEDICARE

## 2023-06-28 DIAGNOSIS — R82.89 OTHER ABNRM FNDNGS ON CYTOLOGICAL: ICD-10-CM

## 2023-06-28 LAB — ESTROGEN SERPL-MCNC: 69 PG/ML

## 2023-06-28 PROCEDURE — 99442: CPT

## 2023-06-28 NOTE — ASSESSMENT
[FreeTextEntry1] : 01/27/2021: Mr. Celis is a 78y/o M presenting today for evaluation of urinary frequency. Last seen in 2017. States that he will feel the need to urinate within 10-15 minutes after drinking water. Denies urinary urgency. Wakes up 2-4x at night to urinate. Notes he eats and drinks late at night. Denies gross hematuria, dysuria, pushing or straining. Urinates 5-6x during the day. No hx of kidney stones. Vitality is good. Had left knee replacement 2 years ago, and right knee replacement 3 years ago, and feels well. \par \par I prescribed the patient Tamsulosin 0.4 mg once daily half hour after a meal. I advised the patient the side effects of nasal congestion, light-headedness, and lack of seminal emission. \par \par Patient is unable to provide a urine sample today and will provide one at a later time. \par Blood work today included BMP, alkaline phosphatase, PSA, and testosterone. \par Pt declines rectal exam at this time, but we will reassess at the next visit. \par RTO in 2 weeks for reassessment. \par \par 02/23/2021: The patient presents today for a 2 week follow up. The patient has been taking tamsulosin 0.4 mg once daily which he reports he has seen some improvement in his symptoms but only slightly. Patient is doing well overall and expressed his appreciation for care. \par \par The patient was standing up and bent over for the LAZARA. LAZARA.Digital rectal exam found no suspicious rectal masses. Anal tone is normal.  It is a 30 gram transurethral resection size. No gross blood on the examining finger. Patient experienced pain that was from dilation of anal stenosis. Scar tissue seen from hemorrhoid operation in the past. \par \par I increased the dosage of Tamsulosin 0.4 mg from once daily to twice daily. \par The patient produced a urine sample which will be sent for urinalysis, urine cytology, and urine culture.\par Patient will RTO or have a telehealth appt in 2 weeks to access the efficacy and side effects of tamsulosin BID. \par \par 05/10/2022:  presents today for a follow up. Still continues to take finasteride 5 mg once daily and tamsulosin 0.4 mg BID. Notes a substantial further improvement since the increase to BID in tamsulosin. No significant urinary difficulties. Not waking excessively at night to urinate. Denies urinary urgency, frequency, pushing, straining, gross hematuria, or dysuria. Denies lightheadedness or nasal congestion. He is doing well and is pleased with the improvement in his urination. \par \par Renewed finasteride 5 mg once daily. Continue tamsulosin 0.4 mg BID. \par Patient will RTO in 6 months or sooner if clinically indicated. \par \par 11/09/2022:  presents today for a follow up, accompanied by his wife. Has been having right flank pain for the past 8 days. Today the pain is 7/10 on pain scale. Pain has varied from 1-10 these past 8 days. Reports some urine that came out looking like coffee, indicating old blood. Has been taking advil for pain. Recently had a CT abdomen and pelvis (flank pain protocol) w/o contrast at [facility] on 11/5/2022. CT demonstrated a 6 mm mid right ureteral calculus, with mild hydronephrosis. 2 mm left renal calculus. Bilateral renal cysts. 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 Mild colonic diverticulosis. Pt has never had kidney stones before. States he used to drink a lot of water but lately has been only drinking about 2 water bottles a day, about a liter a day. Also drinks 1-1.5 cups of coffee in the morning and Organic Smooth Move tea every night to combat constipation. Pt has been taking finasteride 5 mg once daily for 6+ months and has not found it to show any improvement. No longer takes tamsulosin, is unclear why he stopped it. Has noticed the last few weeks he has been urinating more frequently. Denies burning on urination, pushing, and straining. \par \par Clinical findings and CT results were reviewed at length with the patient and his wife. Pt informed this stone should pass on its own. Can continue to take tylenol or advil for the pain. Instructed to schedule a telehealth appt sooner if he feels he needs something stronger for the pain, but as of now he declines. \par The patient produced a urine sample which will be sent for urinalysis, urine cytology, and urine culture. \par Blood work today included serum albumin, BMP, ionized calcium, serum magnesium, serum osmolality, serum phosphorous, PSA, testosterone free and total, & uric acid serum. \par Strongly advised the patient to increase his water consumption to prevent kidney stone formation and growth. Can continue to drink smooth move tea for constipation but advised to cut back on coffee consumption. \par Pt can discontinue finasteride as it has not helped him. I am prescribing the pt Silodosin 8 mg once daily with food. I informed him of the possible side effects of lightheadedness, nasal congestion, and decreased amount of semen when ejaculating. Pt provided with a Good RX card. I informed the patient not only will this improve his urination but it will help the ureteral stone pass and reduce the pain. \par Pt provided with a strainer and specimen container and instructed on how to catch the stone if he passes it. Orders were entered for a stone analysis that he can drop off at is nearest [facility] lab. \par Pt will go for a renal and bladder US in 3 weeks. Will keep scheduled appt time on 12/15/2022. Pt instructed to call back right away if he gets intolerable pain. \par \[facility] 06/07/2023: Mr. ALMA CELIS presents today for a renal US. Pt underwent a right ureteroscopy, laser lithotripsy, basket stone extraction and insertion of right ureteral double-J stent on 12/27/2022 with Dr.Michael Kulkarni. Renal US today demonstrated there are bilateral echogenic foci with twinkle artifact , in the right kidney lower pole 3.8 mm and in the left kidney midpole 6.5 mm. There are bilateral simple cysts , in the right kidney lower pole 9.7 cm x 8.5 cm x 9.4 cm and in the upper pole 4.1 cm x 4.1 cm x 4.8 cm and in the left kidney lower pole 6.3 cm x 7.0 cm x 7.2 cm and in the upper pole 5.2 cm x 5.9 cm x 5.4 cm. Both kidneys are normal in size and echogenicity without hydronephrosis or solid masses visualized. He reports that he is feeling very well. Nocturia x1-3. Occasional urinary urgency and urge incontinence. Has tried finasteride, tamsulosin, and silodosin w/o success in the past. Is trying to drink a lot of liquids, however upon questioning how much he informs me he drinks about 2 750 ml glasses of water. Denies constipation. Patient admits ED, however his wife is not interested. \par \par The standing and bent over position was utilized for the LAZARA as he has knee problems. Digital rectal exam found no suspicious rectal masses. Normal seminal vesicles. Anal tone is tight. The prostate is non tender, with normal texture, discrete borders, and no nodules. It is a 30 gram transurethral resection size, though the exam was limited. No rectal mucosal lesions. No gross blood on the examining finger. Post surgical scar from hemorrhoidectomy which made the exam uncomfortable. \par \par Clinical findings and US results were reviewed at length with the patient. I personally reviewed the imaging myself. The right stone is about the same size, however the left stone has grown some. Pt will complete a 24 hr LithoLink urine collection, form was provided. Instructions were reviewed with the patient and his wife. When the results come back we will discuss and I can suggest lifestyle modifications to reduce the risk of kidney stone growth and formation. For now, patient was strongly advised to increase his water intake further. \par The patient produced a urine sample which will be sent for urinalysis, urine cytology, and urine culture. \par Blood work today included BMP, alkaline phosphatase, PSA, and testosterone. \par I prescribed the patient oxybutynin ER 5 mg once daily.  I informed the patient and his wife that this medication can sometimes cause constipation and dry mouth. I advised him to eat fruits and vegetables, drink lots of water, and if it becomes severe take a stool softener. For the dry mouth I advised him to used Biotene artificial saliva. \par Patient will either RTO or schedule a telehealth visit about 2 weeks after completing the Litholink to review and discuss results as well as discuss oxybutynin. \par \par 06/28/2023: Mr. ALMA CELIS presents today for a follow up audio only telephone visit for which he gave permission for. The pt was located at home, [address] [place] [address], and I was located in my office in [place], [place]. The pt had lab work done on 6/22/2023. PSA was 2.25 on finasteride. This is a significant rise since 11/9/2022. Total testosterone was normal, however free T was low at 4.8. I put in orders for the patient to have an MRI of the prostate yesterday after reviewing his lab work. Pt informs me that he has an appt to obtain the MRI. \par \par Reviewed and discussed lab work of 6/7/2023 and 6/22/2023 in detail with the pt. \par \par Reviewed pre-MRI instructions with the patient if he does not want to do the enema. Pt was advised to drink clear liquids for two days and drink a bottle of magnesium citrate each day. The morning of the procedure he can just have clear liquids. Pt informed me he will try the enema and if not he will do the prep this way. \par \par Patient will schedule a telephone visit about a week after obtaining the MRI to review and discuss results. \par \par Duration of telephone visit: 11 minutes.

## 2023-06-28 NOTE — HISTORY OF PRESENT ILLNESS
[FreeTextEntry1] : 01/27/2021: Mr. Celis is a 78y/o M presenting today for evaluation of urinary frequency. Last seen in 2017. States that he will feel the need to urinate within 10-15 minutes after drinking water. Denies urinary urgency. Wakes up 2-4x at night to urinate. Notes he eats and drinks late at night. Denies gross hematuria, dysuria, pushing or straining. Urinates 5-6x during the day. No hx of kidney stones. Vitality is good. Had left knee replacement 2 years ago, and right knee replacement 3 years ago, and feels well. \par \par 02/23/2021: The patient presents today for a 2 week follow up. The patient has been taking tamsulosin 0.4 mg once daily which he reports he has seen some improvement in his symptoms but only slightly. Patient is doing well overall and expressed his appreciation for care. \par \par 05/10/2022:  presents today for a follow up. Still continues to take finasteride 5 mg once daily and tamsulosin 0.4 mg BID. Notes a substantial further improvement since the increase to BID in tamsulosin. No significant urinary difficulties. Not waking excessively at night to urinate. Denies urinary urgency, frequency, pushing, straining, gross hematuria, or dysuria. Denies lightheadedness or nasal congestion. He is doing well and is pleased with the improvement in his urination. \par \par 11/09/2022:  presents today for a follow up, accompanied by his wife. Has been having right flank pain for the past 8 days. Today the pain is 7/10 on pain scale. Pain has varied from 1-10 these past 8 days. Reports some urine that came out looking like coffee, indicating old blood. Has been taking advil for pain. Recently had a CT abdomen and pelvis (flank pain protocol) w/o contrast at [facility] on 11/5/2022. CT demonstrated a 6 mm mid right ureteral calculus, with mild hydronephrosis. 2 mm left renal calculus. Bilateral renal cysts. 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 Mild colonic diverticulosis. Pt has never had kidney stones before. States he used to drink a lot of water but lately has been only drinking about 2 water bottles a day, about a liter a day. Also drinks 1-1.5 cups of coffee in the morning and Organic Smooth Move tea every night to combat constipation. Pt has been taking finasteride 5 mg once daily for 6+ months and has not found it to show any improvement. No longer takes tamsulosin, is unclear why he stopped it. Has noticed the last few weeks he has been urinating more frequently. Denies burning on urination, pushing, and straining. \par \par 06/07/2023: Mr. ALMA CELIS presents today for a renal US. Pt underwent a right ureteroscopy, laser lithotripsy, basket stone extraction and insertion of right ureteral double-J stent on 12/27/2022 with Dr.Michael Kulkarni. Renal US today demonstrated there are bilateral echogenic foci with twinkle artifact , in the right kidney lower pole 3.8 mm and in the left kidney midpole 6.5 mm. There are bilateral simple cysts , in the right kidney lower pole 9.7 cm x 8.5 cm x 9.4 cm and in the upper pole 4.1 cm x 4.1 cm x 4.8 cm and in the left kidney lower pole 6.3 cm x 7.0 cm x 7.2 cm and in the upper pole 5.2 cm x 5.9 cm x 5.4 cm. Both kidneys are normal in size and echogenicity without hydronephrosis or solid masses visualized. He reports that he is feeling very well. Nocturia x1-3. Occasional urinary urgency and urge incontinence. Has tried finasteride, tamsulosin, and silodosin w/o success in the past. Is trying to drink a lot of liquids, however upon questioning how much he informs me he drinks about 2 750 ml glasses of water. Denies constipation. Patient admits ED, however his wife is not interested. \par \par 06/28/2023: Mr. ALMA CELIS presents today for a follow up audio only telephone visit for which he gave permission for. The pt was located at home, [address] [place] [address], and I was located in my office in [place], [place]. The pt had lab work done on 6/22/2023. PSA was 2.25 on finasteride. This is a significant rise since 11/9/2022. Total testosterone was normal, however free T was low at 4.8. I put in orders for the patient to have an MRI of the prostate yesterday after reviewing his lab work. Pt informs me that he has an appt to obtain the MRI.

## 2023-06-28 NOTE — ADDENDUM
[FreeTextEntry1] : This note was authored by Gracy Pozo working as a scribe for Dr.Gary Flores. I, Dr. Isaac Flores have reviewed the content of this note and confirm it is true and accurate. I personally performed the history and physical examination and made all the decisions 06/28/2023

## 2023-07-10 ENCOUNTER — APPOINTMENT (OUTPATIENT)
Dept: MRI IMAGING | Facility: IMAGING CENTER | Age: 78
End: 2023-07-10
Payer: MEDICARE

## 2023-07-10 ENCOUNTER — RESULT REVIEW (OUTPATIENT)
Age: 78
End: 2023-07-10

## 2023-07-10 ENCOUNTER — OUTPATIENT (OUTPATIENT)
Dept: OUTPATIENT SERVICES | Facility: HOSPITAL | Age: 78
LOS: 1 days | End: 2023-07-10
Payer: MEDICARE

## 2023-07-10 DIAGNOSIS — R97.20 ELEVATED PROSTATE SPECIFIC ANTIGEN [PSA]: ICD-10-CM

## 2023-07-10 DIAGNOSIS — Z98.890 OTHER SPECIFIED POSTPROCEDURAL STATES: Chronic | ICD-10-CM

## 2023-07-10 DIAGNOSIS — Z90.89 ACQUIRED ABSENCE OF OTHER ORGANS: Chronic | ICD-10-CM

## 2023-07-10 DIAGNOSIS — Z96.651 PRESENCE OF RIGHT ARTIFICIAL KNEE JOINT: Chronic | ICD-10-CM

## 2023-07-10 DIAGNOSIS — Z96.652 PRESENCE OF LEFT ARTIFICIAL KNEE JOINT: Chronic | ICD-10-CM

## 2023-07-10 PROCEDURE — 76498 UNLISTED MR PROCEDURE: CPT

## 2023-07-10 PROCEDURE — 72197 MRI PELVIS W/O & W/DYE: CPT

## 2023-07-10 PROCEDURE — 76498P: CUSTOM | Mod: 26

## 2023-07-10 PROCEDURE — A9585: CPT

## 2023-07-10 PROCEDURE — 72197 MRI PELVIS W/O & W/DYE: CPT | Mod: 26

## 2024-02-06 ENCOUNTER — APPOINTMENT (OUTPATIENT)
Dept: INTERNAL MEDICINE | Facility: CLINIC | Age: 79
End: 2024-02-06
Payer: MEDICARE

## 2024-02-06 VITALS
SYSTOLIC BLOOD PRESSURE: 134 MMHG | HEART RATE: 93 BPM | HEIGHT: 66 IN | OXYGEN SATURATION: 98 % | DIASTOLIC BLOOD PRESSURE: 81 MMHG | TEMPERATURE: 98.3 F | BODY MASS INDEX: 35.03 KG/M2 | WEIGHT: 218 LBS | RESPIRATION RATE: 12 BRPM

## 2024-02-06 DIAGNOSIS — J06.9 ACUTE UPPER RESPIRATORY INFECTION, UNSPECIFIED: ICD-10-CM

## 2024-02-06 PROCEDURE — 99204 OFFICE O/P NEW MOD 45 MIN: CPT

## 2024-02-06 RX ORDER — OXYBUTYNIN CHLORIDE 5 MG/1
5 TABLET, EXTENDED RELEASE ORAL DAILY
Qty: 30 | Refills: 11 | Status: DISCONTINUED | COMMUNITY
Start: 2023-06-07 | End: 2024-02-06

## 2024-02-06 RX ORDER — FINASTERIDE 5 MG/1
5 TABLET, FILM COATED ORAL DAILY
Qty: 90 | Refills: 3 | Status: DISCONTINUED | COMMUNITY
Start: 2022-05-10 | End: 2024-02-06

## 2024-02-06 RX ORDER — TAMSULOSIN HYDROCHLORIDE 0.4 MG/1
0.4 CAPSULE ORAL
Qty: 180 | Refills: 2 | Status: DISCONTINUED | COMMUNITY
Start: 2021-02-23 | End: 2024-02-06

## 2024-02-06 RX ORDER — TAMSULOSIN HYDROCHLORIDE 0.4 MG/1
0.4 CAPSULE ORAL
Qty: 90 | Refills: 3 | Status: DISCONTINUED | COMMUNITY
Start: 2021-01-27 | End: 2024-02-06

## 2024-02-06 RX ORDER — SILODOSIN 8 MG/1
8 CAPSULE ORAL DAILY
Qty: 30 | Refills: 11 | Status: DISCONTINUED | COMMUNITY
Start: 2022-11-09 | End: 2024-02-06

## 2024-02-07 ENCOUNTER — NON-APPOINTMENT (OUTPATIENT)
Age: 79
End: 2024-02-07

## 2024-02-07 RX ORDER — METFORMIN HYDROCHLORIDE 500 MG/1
500 TABLET, COATED ORAL TWICE DAILY
Qty: 180 | Refills: 0 | Status: ACTIVE | COMMUNITY
Start: 2024-02-06 | End: 1900-01-01

## 2024-03-31 NOTE — HISTORY OF PRESENT ILLNESS
[FreeTextEntry8] :  Mr. DELVIS CELIS is a 78 year old male, with hx of HLD, DM II,  presents for acute visit  He c/o cough with green phlegm that started about 3 weeks ago. Has been taking OTC cough cough medicine Tested negative for COVID at home Denies fever, chills, sore throat, SOB, chest pain, abdominal pain, N/V/D, leg swelling, headache, dizziness

## 2024-03-31 NOTE — ASSESSMENT
[FreeTextEntry1] :  URI, cough Augmentin 875mg BID with food Tessalon perles Promethazine 5cc po qhs referred for CXR increase po fluids   Pt to f/u in one week or sooner if symptoms worsen

## 2024-03-31 NOTE — PHYSICAL EXAM
[No Acute Distress] : no acute distress [Well-Appearing] : well-appearing [Normal Sclera/Conjunctiva] : normal sclera/conjunctiva [Normal Outer Ear/Nose] : the outer ears and nose were normal in appearance [No JVD] : no jugular venous distention [No Respiratory Distress] : no respiratory distress  [No Accessory Muscle Use] : no accessory muscle use [No Edema] : there was no peripheral edema [Soft] : abdomen soft [Non Tender] : non-tender [Non-distended] : non-distended [Normal Anterior Cervical Nodes] : no anterior cervical lymphadenopathy [Normal Bowel Sounds] : normal bowel sounds [No Rash] : no rash [No Joint Swelling] : no joint swelling [No CVA Tenderness] : no CVA  tenderness [Normal Gait] : normal gait [Normal] : affect was normal and insight and judgment were intact [de-identified] : coarse breath sounds

## 2024-04-03 ENCOUNTER — APPOINTMENT (OUTPATIENT)
Dept: INTERNAL MEDICINE | Facility: CLINIC | Age: 79
End: 2024-04-03
Payer: MEDICARE

## 2024-04-03 VITALS
RESPIRATION RATE: 14 BRPM | DIASTOLIC BLOOD PRESSURE: 73 MMHG | BODY MASS INDEX: 34.72 KG/M2 | HEIGHT: 66 IN | WEIGHT: 216 LBS | HEART RATE: 87 BPM | OXYGEN SATURATION: 97 % | TEMPERATURE: 97.7 F | SYSTOLIC BLOOD PRESSURE: 138 MMHG

## 2024-04-03 DIAGNOSIS — J32.9 CHRONIC SINUSITIS, UNSPECIFIED: ICD-10-CM

## 2024-04-03 PROCEDURE — 99214 OFFICE O/P EST MOD 30 MIN: CPT

## 2024-04-03 RX ORDER — PROMETHAZINE HYDROCHLORIDE AND DEXTROMETHORPHAN HYDROBROMIDE ORAL SOLUTION 15; 6.25 MG/5ML; MG/5ML
6.25-15 SOLUTION ORAL
Qty: 1 | Refills: 0 | Status: DISCONTINUED | COMMUNITY
Start: 2024-02-06 | End: 2024-04-03

## 2024-04-03 NOTE — ADDENDUM
[FreeTextEntry1] : I, Jennifer Kulkarni, am scribing for and in the presence of Dr. Jeanette Campa, DO in the following sections HISTORY OF PRESENT ILLNESS, PAST MEDICAL/FAMILY/SOCIAL HISTORY; REVIEW OF SYSTEMS; VITAL SIGNS; PHYSICAL EXAM; ASSESSMENT/PLAN on  04/03/2024.  I personally performed the services described in the documentation, reviewed the documentation recorded by the scribe in my presence, and it accurately and completely records my words and actions.

## 2024-04-03 NOTE — ASSESSMENT
[FreeTextEntry1] : Sinusitis Augmentin 875mg BID with food naosnex nasal spray BID Referred to ENT  HLD  cont Simvastatin 40 mg qhs Reinforced the importance of following a low cholesterol/low fat diet, weight loss, and increased physical activity.  DMII cont Metformin 500 mg BID with food Reinforced the importance of following a low sugar/low carbohydrate diet

## 2024-04-03 NOTE — PHYSICAL EXAM
[No Acute Distress] : no acute distress [Well-Appearing] : well-appearing [Normal Sclera/Conjunctiva] : normal sclera/conjunctiva [Normal TMs] : both tympanic membranes were normal [Normal Outer Ear/Nose] : the outer ears and nose were normal in appearance [No JVD] : no jugular venous distention [No Lymphadenopathy] : no lymphadenopathy [Thyroid Normal, No Nodules] : the thyroid was normal and there were no nodules present [Supple] : supple [No Accessory Muscle Use] : no accessory muscle use [No Respiratory Distress] : no respiratory distress  [Normal Rate] : normal rate  [No Murmur] : no murmur heard [Normal S1, S2] : normal S1 and S2 [Regular Rhythm] : with a regular rhythm [Pedal Pulses Present] : the pedal pulses are present [No Edema] : there was no peripheral edema [No Extremity Clubbing/Cyanosis] : no extremity clubbing/cyanosis [Soft] : abdomen soft [Non Tender] : non-tender [Non-distended] : non-distended [Normal Bowel Sounds] : normal bowel sounds [Normal Anterior Cervical Nodes] : no anterior cervical lymphadenopathy [No CVA Tenderness] : no CVA  tenderness [No Spinal Tenderness] : no spinal tenderness [No Joint Swelling] : no joint swelling [No Rash] : no rash [Grossly Normal Strength/Tone] : grossly normal strength/tone [No Focal Deficits] : no focal deficits [Coordination Grossly Intact] : coordination grossly intact [Normal Gait] : normal gait [Normal Insight/Judgement] : insight and judgment were intact [Normal Affect] : the affect was normal [de-identified] : mild erythematous pharynx, + maxillary sinus tenderness

## 2024-04-03 NOTE — HISTORY OF PRESENT ILLNESS
[de-identified] : Mr. DELVIS CELIS is a 78-year-old male with hx. of DM II and HLD, who presents for an acute visit.   Pt. states that ever since the 02/06/2024 visit he continues to experience the same symptoms of productive cough - green sputum, facial flushing. Reports that Sx occur mostly in the morning. It got better after he took the abxs but symptoms returned  Denies taking any OTC medication for the cough. Denies fever, N/V/D, dizziness or lightheadedness.   States this morning he noticed after taking a shower he had a HA, and after he took two Advil, HA was resolved.

## 2024-04-16 ENCOUNTER — APPOINTMENT (OUTPATIENT)
Dept: INTERNAL MEDICINE | Facility: CLINIC | Age: 79
End: 2024-04-16
Payer: MEDICARE

## 2024-04-16 VITALS
RESPIRATION RATE: 14 BRPM | BODY MASS INDEX: 34.72 KG/M2 | SYSTOLIC BLOOD PRESSURE: 134 MMHG | OXYGEN SATURATION: 96 % | WEIGHT: 216 LBS | DIASTOLIC BLOOD PRESSURE: 86 MMHG | TEMPERATURE: 97.9 F | HEART RATE: 99 BPM | HEIGHT: 66 IN

## 2024-04-16 DIAGNOSIS — J02.9 ACUTE PHARYNGITIS, UNSPECIFIED: ICD-10-CM

## 2024-04-16 PROCEDURE — 99214 OFFICE O/P EST MOD 30 MIN: CPT

## 2024-04-22 LAB
BASOPHILS # BLD AUTO: 0.06 K/UL
BASOPHILS NFR BLD AUTO: 0.9 %
EOSINOPHIL # BLD AUTO: 0.13 K/UL
EOSINOPHIL NFR BLD AUTO: 1.9 %
HCT VFR BLD CALC: 47.8 %
HGB BLD-MCNC: 14.9 G/DL
IMM GRANULOCYTES NFR BLD AUTO: 0.1 %
INFLUENZA A RESULT: NOT DETECTED
INFLUENZA B RESULT: NOT DETECTED
LYMPHOCYTES # BLD AUTO: 1.73 K/UL
LYMPHOCYTES NFR BLD AUTO: 25.8 %
MAN DIFF?: NORMAL
MCHC RBC-ENTMCNC: 29.6 PG
MCHC RBC-ENTMCNC: 31.2 GM/DL
MCV RBC AUTO: 95 FL
MONOCYTES # BLD AUTO: 0.63 K/UL
MONOCYTES NFR BLD AUTO: 9.4 %
NEUTROPHILS # BLD AUTO: 4.15 K/UL
NEUTROPHILS NFR BLD AUTO: 61.9 %
PLATELET # BLD AUTO: 225 K/UL
RBC # BLD: 5.03 M/UL
RBC # FLD: 15 %
RESP SYN VIRUS RESULT: NOT DETECTED
SARS-COV-2 RESULT: NOT DETECTED
WBC # FLD AUTO: 6.71 K/UL

## 2024-04-23 LAB
ALBUMIN SERPL ELPH-MCNC: 4.5 G/DL
ALP BLD-CCNC: 59 U/L
ALT SERPL-CCNC: 21 U/L
ANION GAP SERPL CALC-SCNC: 14 MMOL/L
AST SERPL-CCNC: 25 U/L
BILIRUB SERPL-MCNC: 0.4 MG/DL
BUN SERPL-MCNC: 18 MG/DL
CALCIUM SERPL-MCNC: 10.7 MG/DL
CHLORIDE SERPL-SCNC: 102 MMOL/L
CO2 SERPL-SCNC: 25 MMOL/L
CREAT SERPL-MCNC: 0.73 MG/DL
EGFR: 93 ML/MIN/1.73M2
GLUCOSE SERPL-MCNC: 92 MG/DL
POTASSIUM SERPL-SCNC: 4.1 MMOL/L
PROT SERPL-MCNC: 7 G/DL
SODIUM SERPL-SCNC: 141 MMOL/L

## 2024-04-29 LAB
25(OH)D3 SERPL-MCNC: 34.5 NG/ML
CALCIUM SERPL-MCNC: 10.4 MG/DL
CALCIUM SERPL-MCNC: 10.4 MG/DL
PARATHYROID HORMONE INTACT: 50 PG/ML

## 2024-05-20 ENCOUNTER — APPOINTMENT (OUTPATIENT)
Dept: OTOLARYNGOLOGY | Facility: CLINIC | Age: 79
End: 2024-05-20
Payer: MEDICARE

## 2024-05-20 VITALS
HEART RATE: 89 BPM | WEIGHT: 210 LBS | DIASTOLIC BLOOD PRESSURE: 74 MMHG | HEIGHT: 66 IN | BODY MASS INDEX: 33.75 KG/M2 | TEMPERATURE: 98.4 F | SYSTOLIC BLOOD PRESSURE: 144 MMHG

## 2024-05-20 DIAGNOSIS — H93.8X3 OTHER SPECIFIED DISORDERS OF EAR, BILATERAL: ICD-10-CM

## 2024-05-20 DIAGNOSIS — H61.23 IMPACTED CERUMEN, BILATERAL: ICD-10-CM

## 2024-05-20 PROCEDURE — 69210 REMOVE IMPACTED EAR WAX UNI: CPT

## 2024-05-20 PROCEDURE — 99203 OFFICE O/P NEW LOW 30 MIN: CPT | Mod: 25

## 2024-05-20 NOTE — ASSESSMENT
[FreeTextEntry1] : 78 year old male presents with ear clogging. On exam, excessive cerumen bilaterally, cleared today using curette and suction. Hearing loss - likely conductive resolved after disimpaction patient declined audiogram ear hygiene discussed preventive measures and signs of accumulation

## 2024-05-20 NOTE — END OF VISIT
[FreeTextEntry3] : I personally saw and examined the patient in detail. I spoke to LIZET Chavez regarding the assessment and plan of care.  I preformed the procedures and I reviewed the above assessment and plan of care, and agree. I have made changes in changes in the body of the note where appropriate.

## 2024-05-20 NOTE — PROCEDURE
[FreeTextEntry3] : Procedure- removal of cerumen bilaterally Diagnosis - cerumen impaction bilateral ears found to have impacted cerumen - they were cleared with suction and curette, canals appeared normal. Procedure- removal of cerumen bilaterally

## 2024-05-20 NOTE — CONSULT LETTER
[FreeTextEntry1] : Dear Dr. JOSELITO CHAO  I had the pleasure of evaluating your patient DELVIS CELIS, thank you for allowing us to participate in their care. please see full note detailing our visit below. If you have any questions, please do not hesitate to call me and I would be happy to discuss further.   Jose Rafael Cotaes M.D. Attending Physician,   Department of Otolaryngology - Head and Neck Surgery [facility]  Office: [phone] Fax: [phone]

## 2024-05-27 NOTE — HISTORY OF PRESENT ILLNESS
[de-identified] : Mr. DELVIS CELIS is a 78 year old male with a Hx of DM II and HLD, presents for an acute visit.   Pt c/o sore throat which started 3-4 days ago. Finished Augmentin on Saturday Denies any SOB, CP, abdominal pain, N/V/D, headache, dizziness, or leg swelling. There are no other concerns/complaints at this time.

## 2024-05-27 NOTE — ASSESSMENT
[FreeTextEntry1] :  Sore throat, mild erythematous pharynx on exam-  finished augmentin- ? GERD related RSV/flu/covid PCR done today start famotidine 20mg daily We'll check CMP and CBC today  b/l cerumen impaction f/u with ENT  HLD cont Simvastatin 40 mg qhs Reinforced the importance of following a low cholesterol/low fat diet, weight loss, and increased physical activity.  DMII cont Metformin 500 mg BID with food Reinforced the importance of following a low sugar/low carbohydrate diet.  Blood work ordered. Follow up in one week for lab results

## 2024-05-27 NOTE — PHYSICAL EXAM

## 2024-06-05 ENCOUNTER — APPOINTMENT (OUTPATIENT)
Dept: INTERNAL MEDICINE | Facility: CLINIC | Age: 79
End: 2024-06-05
Payer: MEDICARE

## 2024-06-05 VITALS
HEIGHT: 66 IN | OXYGEN SATURATION: 96 % | DIASTOLIC BLOOD PRESSURE: 85 MMHG | BODY MASS INDEX: 33.91 KG/M2 | TEMPERATURE: 97.3 F | SYSTOLIC BLOOD PRESSURE: 138 MMHG | RESPIRATION RATE: 14 BRPM | HEART RATE: 71 BPM | WEIGHT: 211 LBS

## 2024-06-05 DIAGNOSIS — Z00.00 ENCOUNTER FOR GENERAL ADULT MEDICAL EXAMINATION W/OUT ABNORMAL FINDINGS: ICD-10-CM

## 2024-06-05 DIAGNOSIS — R20.2 PARESTHESIA OF SKIN: ICD-10-CM

## 2024-06-05 PROCEDURE — G0444 DEPRESSION SCREEN ANNUAL: CPT | Mod: 59

## 2024-06-05 PROCEDURE — 99397 PER PM REEVAL EST PAT 65+ YR: CPT

## 2024-06-05 RX ORDER — MOMETASONE 50 UG/1
50 SPRAY, METERED NASAL TWICE DAILY
Qty: 1 | Refills: 3 | Status: DISCONTINUED | COMMUNITY
Start: 2024-04-03 | End: 2024-06-05

## 2024-06-05 RX ORDER — FAMOTIDINE 20 MG/1
20 TABLET, FILM COATED ORAL
Qty: 30 | Refills: 3 | Status: DISCONTINUED | COMMUNITY
Start: 2024-04-16 | End: 2024-06-05

## 2024-06-07 ENCOUNTER — APPOINTMENT (OUTPATIENT)
Dept: UROLOGY | Facility: CLINIC | Age: 79
End: 2024-06-07
Payer: MEDICARE

## 2024-06-07 DIAGNOSIS — N28.9 DISORDER OF KIDNEY AND URETER, UNSPECIFIED: ICD-10-CM

## 2024-06-07 DIAGNOSIS — R39.15 URGENCY OF URINATION: ICD-10-CM

## 2024-06-07 DIAGNOSIS — R31.29 OTHER MICROSCOPIC HEMATURIA: ICD-10-CM

## 2024-06-07 DIAGNOSIS — N20.0 CALCULUS OF KIDNEY: ICD-10-CM

## 2024-06-07 DIAGNOSIS — N13.8 BENIGN PROSTATIC HYPERPLASIA WITH LOWER URINARY TRACT SYMPMS: ICD-10-CM

## 2024-06-07 DIAGNOSIS — R79.89 OTHER SPECIFIED ABNORMAL FINDINGS OF BLOOD CHEMISTRY: ICD-10-CM

## 2024-06-07 DIAGNOSIS — N28.1 CYST OF KIDNEY, ACQUIRED: ICD-10-CM

## 2024-06-07 DIAGNOSIS — N40.1 BENIGN PROSTATIC HYPERPLASIA WITH LOWER URINARY TRACT SYMPMS: ICD-10-CM

## 2024-06-07 DIAGNOSIS — R97.20 ELEVATED PROSTATE, SPECIFIC ANTIGEN [PSA]: ICD-10-CM

## 2024-06-07 PROCEDURE — 99442: CPT

## 2024-06-09 PROBLEM — N40.1 BENIGN LOCALIZED HYPERPLASIA OF PROSTATE WITH URINARY OBSTRUCTION: Status: ACTIVE | Noted: 2021-01-27

## 2024-06-09 PROBLEM — N20.0 NEPHROLITHIASIS: Status: ACTIVE | Noted: 2022-11-09

## 2024-06-09 PROBLEM — N28.9 RENAL INSUFFICIENCY: Status: ACTIVE | Noted: 2024-06-09

## 2024-06-09 PROBLEM — N28.1 RENAL CYST: Status: ACTIVE | Noted: 2023-06-11

## 2024-06-09 PROBLEM — R39.15 URINARY URGENCY: Status: ACTIVE | Noted: 2023-06-07

## 2024-06-09 PROBLEM — R97.20 ELEVATED PSA: Status: ACTIVE | Noted: 2023-06-18

## 2024-06-09 PROBLEM — R79.89 LOW TESTOSTERONE IN MALE: Status: ACTIVE | Noted: 2023-06-18

## 2024-06-09 NOTE — ASSESSMENT
[FreeTextEntry1] : 01/27/2021: Mr. Celis is a 76y/o M presenting today for evaluation of urinary frequency. Last seen in 2017. States that he will feel the need to urinate within 10-15 minutes after drinking water. Denies urinary urgency. Wakes up 2-4x at night to urinate. Notes he eats and drinks late at night. Denies gross hematuria, dysuria, pushing or straining. Urinates 5-6x during the day. No hx of kidney stones. Vitality is good. Had left knee replacement 2 years ago, and right knee replacement 3 years ago, and feels well.   I prescribed the patient Tamsulosin 0.4 mg once daily half hour after a meal. I advised the patient the side effects of nasal congestion, light-headedness, and lack of seminal emission.   Patient is unable to provide a urine sample today and will provide one at a later time.  Blood work today included BMP, alkaline phosphatase, PSA, and testosterone.  Pt declines rectal exam at this time, but we will reassess at the next visit.  RTO in 2 weeks for reassessment.   02/23/2021: The patient presents today for a 2 week follow up. The patient has been taking tamsulosin 0.4 mg once daily which he reports he has seen some improvement in his symptoms but only slightly. Patient is doing well overall and expressed his appreciation for care.   The patient was standing up and bent over for the LAZARA. LAZARA.Digital rectal exam found no suspicious rectal masses. Anal tone is normal.  It is a 30 gram transurethral resection size. No gross blood on the examining finger. Patient experienced pain that was from dilation of anal stenosis. Scar tissue seen from hemorrhoid operation in the past.   I increased the dosage of Tamsulosin 0.4 mg from once daily to twice daily.  The patient produced a urine sample which will be sent for urinalysis, urine cytology, and urine culture. Patient will RTO or have a telehealth appt in 2 weeks to access the efficacy and side effects of tamsulosin BID.   05/10/2022:  presents today for a follow up. Still continues to take finasteride 5 mg once daily and tamsulosin 0.4 mg BID. Notes a substantial further improvement since the increase to BID in tamsulosin. No significant urinary difficulties. Not waking excessively at night to urinate. Denies urinary urgency, frequency, pushing, straining, gross hematuria, or dysuria. Denies lightheadedness or nasal congestion. He is doing well and is pleased with the improvement in his urination.   Renewed finasteride 5 mg once daily. Continue tamsulosin 0.4 mg BID.  Patient will RTO in 6 months or sooner if clinically indicated.   11/09/2022:  presents today for a follow up, accompanied by his wife. Has been having right flank pain for the past 8 days. Today the pain is 7/10 on pain scale. Pain has varied from 1-10 these past 8 days. Reports some urine that came out looking like coffee, indicating old blood. Has been taking advil for pain. Recently had a CT abdomen and pelvis (flank pain protocol) w/o contrast at [facility] on 11/5/2022. CT demonstrated a 6 mm mid right ureteral calculus, with mild hydronephrosis. 2 mm left renal calculus. Bilateral renal cysts. 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 Mild colonic diverticulosis. Pt has never had kidney stones before. States he used to drink a lot of water but lately has been only drinking about 2 water bottles a day, about a liter a day. Also drinks 1-1.5 cups of coffee in the morning and Organic Smooth Move tea every night to combat constipation. Pt has been taking finasteride 5 mg once daily for 6+ months and has not found it to show any improvement. No longer takes tamsulosin, is unclear why he stopped it. Has noticed the last few weeks he has been urinating more frequently. Denies burning on urination, pushing, and straining.   Clinical findings and CT results were reviewed at length with the patient and his wife. Pt informed this stone should pass on its own. Can continue to take tylenol or advil for the pain. Instructed to schedule a telehealth appt sooner if he feels he needs something stronger for the pain, but as of now he declines.  The patient produced a urine sample which will be sent for urinalysis, urine cytology, and urine culture.  Blood work today included serum albumin, BMP, ionized calcium, serum magnesium, serum osmolality, serum phosphorous, PSA, testosterone free and total, & uric acid serum.  Strongly advised the patient to increase his water consumption to prevent kidney stone formation and growth. Can continue to drink smooth move tea for constipation but advised to cut back on coffee consumption.  Pt can discontinue finasteride as it has not helped him. I am prescribing the pt Silodosin 8 mg once daily with food. I informed him of the possible side effects of lightheadedness, nasal congestion, and decreased amount of semen when ejaculating. Pt provided with a Good RX card. I informed the patient not only will this improve his urination but it will help the ureteral stone pass and reduce the pain.  Pt provided with a strainer and specimen container and instructed on how to catch the stone if he passes it. Orders were entered for a stone analysis that he can drop off at is nearest [facility] lab.  Pt will go for a renal and bladder US in 3 weeks. Will keep scheduled appt time on 12/15/2022. Pt instructed to call back right away if he gets intolerable pain.   06/07/2023: Mr. ALMA CELIS presents today for a renal US. Pt underwent a right ureteroscopy, laser lithotripsy, basket stone extraction and insertion of right ureteral double-J stent on 12/27/2022 with Dr.Michael Kulkarni. Renal US today demonstrated there are bilateral echogenic foci with twinkle artifact , in the right kidney lower pole 3.8 mm and in the left kidney midpole 6.5 mm. There are bilateral simple cysts , in the right kidney lower pole 9.7 cm x 8.5 cm x 9.4 cm and in the upper pole 4.1 cm x 4.1 cm x 4.8 cm and in the left kidney lower pole 6.3 cm x 7.0 cm x 7.2 cm and in the upper pole 5.2 cm x 5.9 cm x 5.4 cm. Both kidneys are normal in size and echogenicity without hydronephrosis or solid masses visualized. He reports that he is feeling very well. Nocturia x1-3. Occasional urinary urgency and urge incontinence. Has tried finasteride, tamsulosin, and silodosin w/o success in the past. Is trying to drink a lot of liquids, however upon questioning how much he informs me he drinks about 2 750 ml glasses of water. Denies constipation. Patient admits ED, however his wife is not interested.   The standing and bent over position was utilized for the LAZARA as he has knee problems. Digital rectal exam found no suspicious rectal masses. Normal seminal vesicles. Anal tone is tight. The prostate is non tender, with normal texture, discrete borders, and no nodules. It is a 30 gram transurethral resection size, though the exam was limited. No rectal mucosal lesions. No gross blood on the examining finger. Post surgical scar from hemorrhoidectomy which made the exam uncomfortable.   Clinical findings and US results were reviewed at length with the patient. I personally reviewed the imaging myself. The right stone is about the same size, however the left stone has grown some. Pt will complete a 24 hr LithoLink urine collection, form was provided. Instructions were reviewed with the patient and his wife. When the results come back we will discuss and I can suggest lifestyle modifications to reduce the risk of kidney stone growth and formation. For now, patient was strongly advised to increase his water intake further.  The patient produced a urine sample which will be sent for urinalysis, urine cytology, and urine culture.  Blood work today included BMP, alkaline phosphatase, PSA, and testosterone.  I prescribed the patient oxybutynin ER 5 mg once daily.  I informed the patient and his wife that this medication can sometimes cause constipation and dry mouth. I advised him to eat fruits and vegetables, drink lots of water, and if it becomes severe take a stool softener. For the dry mouth I advised him to used Biotene artificial saliva.  Patient will either RTO or schedule a telehealth visit about 2 weeks after completing the Litholink to review and discuss results as well as discuss oxybutynin.   06/28/2023: Mr. ALMA CELIS presents today for a follow up audio only telephone visit for which he gave permission for. The pt was located at home, [address] [place] [address], and I was located in my office in [place], [place]. The pt had lab work done on 6/22/2023. PSA was 2.25 on finasteride. This is a significant rise since 11/9/2022. Total testosterone was normal, however free T was low at 4.8. I put in orders for the patient to have an MRI of the prostate yesterday after reviewing his lab work. Pt informs me that he has an appt to obtain the MRI.   Reviewed and discussed lab work of 6/7/2023 and 6/22/2023 in detail with the pt.  Reviewed pre-MRI instructions with the patient if he does not want to do the enema. Pt was advised to drink clear liquids for two days and drink a bottle of magnesium citrate each day. The morning of the procedure he can just have clear liquids. Pt informed me he will try the enema and if not he will do the prep this way.  Patient will schedule a telephone visit about a week after obtaining the MRI to review and discuss results.   06/07/2024: Mr. CELIS presents today for a follow up audio-only telehealth visit for which they gave permission for. The patient was located at home [address] [place] [address] and I was located in my office in [place], [place]. 7/11/23 MR Prostate demonstrated No MRI targetable lesions. PIRADS 2. Right inguinal hernia with partial herniation of the right urinary bladder. Prostate Volume: 39.8 mL. PSA density: 0.06 ng/mL/mL on finasteride; equivalent to 0.12 ng/mL/mL without finasteride. PSA of 6/5/24 obtained from his PCP is very good for his age being 2.87. PSA has risen slightly as before it was 2.14 but this is not suspicious of cancer. Patient reports today feeling very well. Patient denies dysuria, gross hematuria, burning, urgency, frequency, pushing, straining, or incontinence. Patient reports urination improved w/o Oxybutynin and Finasteride.  Vitality and mood are good. Walks 30 mins 7 times a week. The patient continues on Atorvastatin and metformin. Overall patient is within good standing.   Clinical findings and MR prostate results were reviewed at length with the patient. I personally reviewed the imaging myself. Prostate does not have substantial risk of prostate cancer. Radiology determined that prostate was PI-RADS 2 which indicates clinically significant prostate cancer is unlikely to be present. Needle biopsy of the prostate is not needed at this time. The patient has a right inguinal hernia that contains a part of the bladder.  The patient will return to office in 6 months for LAZARA.    Duration of call 12 Minutes.

## 2024-06-09 NOTE — HEALTH RISK ASSESSMENT
[Fair] : ~his/her~ current health as fair  [Very Good] : ~his/her~  mood as very good [No] : No [No falls in past year] : Patient reported no falls in the past year [Little interest or pleasure doing things] : 1) Little interest or pleasure doing things [Feeling down, depressed, or hopeless] : 2) Feeling down, depressed, or hopeless [0] : 2) Feeling down, depressed, or hopeless: Not at all (0) [PHQ-2 Negative - No further assessment needed] : PHQ-2 Negative - No further assessment needed [Never] : Never [Patient reported colonoscopy was abnormal] : Patient reported colonoscopy was abnormal [None] : None [With Family] : lives with family [] :  [Feels Safe at Home] : Feels safe at home [Fully functional (bathing, dressing, toileting, transferring, walking, feeding)] : Fully functional (bathing, dressing, toileting, transferring, walking, feeding) [Fully functional (using the telephone, shopping, preparing meals, housekeeping, doing laundry, using] : Fully functional and needs no help or supervision to perform IADLs (using the telephone, shopping, preparing meals, housekeeping, doing laundry, using transportation, managing medications and managing finances) [Reports normal functional visual acuity (ie: able to read med bottle)] : Reports normal functional visual acuity [Smoke Detector] : smoke detector [Carbon Monoxide Detector] : carbon monoxide detector [Seat Belt] :  uses seat belt [Sunscreen] : uses sunscreen [de-identified] : Maintains active by walking 20 to 25 minutes. [de-identified] : Maintains a healthy diet.  [[id number]] : 0 [Reports changes in hearing] : Reports no changes in hearing [Reports changes in vision] : Reports no changes in vision [Reports changes in dental health] : Reports no changes in dental health [Guns at Home] : no guns at home [Travel to Developing Areas] : does not  travel to developing areas [TB Exposure] : is not being exposed to tuberculosis [ColonoscopyDate] : 2019 [ColonoscopyComments] : Hemorrhoids. Was told to return in 10 years.

## 2024-06-09 NOTE — ADDENDUM
[FreeTextEntry1] : This note was authored by Krista Solorzano working as a scribe for Dr. Isaac Flores. I, Dr. Isaac Flores have reviewed the content of this note and confirm it is true and accurate. I personally performed the history and physical examination and made all the decisions 06/07/2024.

## 2024-06-09 NOTE — ADDENDUM
[FreeTextEntry1] : Documented by Jennifer Kulkarni acting as a scribe for Dr. Jeanette Campa. 06/05/2024   All medical record entries made by the scribe were at my, Dr. Jeanette Campa, direction and personally dictated by me on 06/05/2024. I have reviewed the chart and agree that the record accurately reflects my personal performance of the history, physical exam, assessment and plan. I have also personally directed, reviewed, and agreed with the chart.

## 2024-06-09 NOTE — HISTORY OF PRESENT ILLNESS
[de-identified] : Mr. DELVIS CELIS is a 78 year old male accompanied by his wife, with a Hx of DM II and HLD, who presents for an annual physical.  He c/o tingling on and off in both hands. He used to work as a  and was told in the past that he hasd carpal tunnel He is otherwise feeling well. Denies any SOB, CP, abdominal pain, N/V/D, headache, dizziness, or leg swelling. Reports compliance with taking his meds daily.

## 2024-06-09 NOTE — HISTORY OF PRESENT ILLNESS
[FreeTextEntry1] : 01/27/2021: Mr. Celis is a 78y/o M presenting today for evaluation of urinary frequency. Last seen in 2017. States that he will feel the need to urinate within 10-15 minutes after drinking water. Denies urinary urgency. Wakes up 2-4x at night to urinate. Notes he eats and drinks late at night. Denies gross hematuria, dysuria, pushing or straining. Urinates 5-6x during the day. No hx of kidney stones. Vitality is good. Had left knee replacement 2 years ago, and right knee replacement 3 years ago, and feels well.   02/23/2021: The patient presents today for a 2 week follow up. The patient has been taking tamsulosin 0.4 mg once daily which he reports he has seen some improvement in his symptoms but only slightly. Patient is doing well overall and expressed his appreciation for care.   05/10/2022:  presents today for a follow up. Still continues to take finasteride 5 mg once daily and tamsulosin 0.4 mg BID. Notes a substantial further improvement since the increase to BID in tamsulosin. No significant urinary difficulties. Not waking excessively at night to urinate. Denies urinary urgency, frequency, pushing, straining, gross hematuria, or dysuria. Denies lightheadedness or nasal congestion. He is doing well and is pleased with the improvement in his urination.   11/09/2022:  presents today for a follow up, accompanied by his wife. Has been having right flank pain for the past 8 days. Today the pain is 7/10 on pain scale. Pain has varied from 1-10 these past 8 days. Reports some urine that came out looking like coffee, indicating old blood. Has been taking advil for pain. Recently had a CT abdomen and pelvis (flank pain protocol) w/o contrast at [facility] on 11/5/2022. CT demonstrated a 6 mm mid right ureteral calculus, with mild hydronephrosis. 2 mm left renal calculus. Bilateral renal cysts. 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 Mild colonic diverticulosis. Pt has never had kidney stones before. States he used to drink a lot of water but lately has been only drinking about 2 water bottles a day, about a liter a day. Also drinks 1-1.5 cups of coffee in the morning and Organic Smooth Move tea every night to combat constipation. Pt has been taking finasteride 5 mg once daily for 6+ months and has not found it to show any improvement. No longer takes tamsulosin, is unclear why he stopped it. Has noticed the last few weeks he has been urinating more frequently. Denies burning on urination, pushing, and straining.   06/07/2023: Mr. ALMA CELIS presents today for a renal US. Pt underwent a right ureteroscopy, laser lithotripsy, basket stone extraction and insertion of right ureteral double-J stent on 12/27/2022 with Dr.Michael Kulkarni. Renal US today demonstrated there are bilateral echogenic foci with twinkle artifact , in the right kidney lower pole 3.8 mm and in the left kidney midpole 6.5 mm. There are bilateral simple cysts , in the right kidney lower pole 9.7 cm x 8.5 cm x 9.4 cm and in the upper pole 4.1 cm x 4.1 cm x 4.8 cm and in the left kidney lower pole 6.3 cm x 7.0 cm x 7.2 cm and in the upper pole 5.2 cm x 5.9 cm x 5.4 cm. Both kidneys are normal in size and echogenicity without hydronephrosis or solid masses visualized. He reports that he is feeling very well. Nocturia x1-3. Occasional urinary urgency and urge incontinence. Has tried finasteride, tamsulosin, and silodosin w/o success in the past. Is trying to drink a lot of liquids, however upon questioning how much he informs me he drinks about 2 750 ml glasses of water. Denies constipation. Patient admits ED, however his wife is not interested.   06/28/2023: Mr. ALMA CELIS presents today for a follow up audio only telephone visit for which he gave permission for. The pt was located at home, [address] [place] [address], and I was located in my office in [place], [place]. The pt had lab work done on 6/22/2023. PSA was 2.25 on finasteride. This is a significant rise since 11/9/2022. Total testosterone was normal, however free T was low at 4.8. I put in orders for the patient to have an MRI of the prostate yesterday after reviewing his lab work. Pt informs me that he has an appt to obtain the MRI.   06/07/2024: Mr. CELIS presents today for a follow up audio-only telehealth visit for which they gave permission for. The patient was located at home [address] [place] [address] and I was located in my office in [place], [place]. 7/11/23 MR Prostate demonstrated No MRI targetable lesions. PIRADS 2. Right inguinal hernia with partial herniation of the right urinary bladder. Prostate Volume: 39.8 mL. PSA density: 0.06 ng/mL/mL on finasteride; equivalent to 0.12 ng/mL/mL without finasteride. PSA of 6/5/24 obtained from his PCP is very good for his age being 2.87. PSA has risen slightly as before it was 2.14 but this is not suspicious of cancer. Patient reports today feeling very well. Patient denies dysuria, gross hematuria, burning, urgency, frequency, pushing, straining, or incontinence. Patient reports urination improved w/o Oxybutynin and Finasteride.  Vitality and mood are good. Walks 30 mins 7 times a week. The patient continues on Atorvastatin and metformin. Overall patient is within good standing.

## 2024-06-09 NOTE — ASSESSMENT
[FreeTextEntry1] : Physical UTD with colonoscopy  Paresthesia of bilateral hands Referred to neurology  HLD cont Simvastatin 40 mg qhs Reinforced the importance of following a low cholesterol/low fat diet. We'll check Lipid panel and LFTs today.  DMII cont Metformin 500 mg BID  Reinforced the importance of following a low sugar/low carbohydrate diet. We'll check glucose and HbA1c today.  Blood work ordered. Follow up in one week for lab results

## 2024-06-30 LAB
25(OH)D3 SERPL-MCNC: 39.7 NG/ML
ALBUMIN SERPL ELPH-MCNC: 4.7 G/DL
ALP BLD-CCNC: 50 U/L
ALT SERPL-CCNC: 19 U/L
ANION GAP SERPL CALC-SCNC: 14 MMOL/L
APPEARANCE: CLEAR
AST SERPL-CCNC: 23 U/L
BASOPHILS # BLD AUTO: 0.06 K/UL
BASOPHILS NFR BLD AUTO: 0.7 %
BILIRUB SERPL-MCNC: 0.8 MG/DL
BILIRUBIN URINE: NEGATIVE
BLOOD URINE: NEGATIVE
BUN SERPL-MCNC: 17 MG/DL
CALCIUM SERPL-MCNC: 10.1 MG/DL
CHLORIDE SERPL-SCNC: 100 MMOL/L
CHOLEST SERPL-MCNC: 141 MG/DL
CO2 SERPL-SCNC: 24 MMOL/L
COLOR: YELLOW
CREAT SERPL-MCNC: 0.69 MG/DL
CREAT SPEC-SCNC: 100 MG/DL
EGFR: 95 ML/MIN/1.73M2
EOSINOPHIL # BLD AUTO: 0.12 K/UL
EOSINOPHIL NFR BLD AUTO: 1.5 %
ESTIMATED AVERAGE GLUCOSE: 137 MG/DL
GLUCOSE QUALITATIVE U: NEGATIVE MG/DL
GLUCOSE SERPL-MCNC: 104 MG/DL
HBA1C MFR BLD HPLC: 6.4 %
HCT VFR BLD CALC: 47.7 %
HDLC SERPL-MCNC: 53 MG/DL
HGB BLD-MCNC: 15.3 G/DL
IMM GRANULOCYTES NFR BLD AUTO: 0.4 %
KETONES URINE: NEGATIVE MG/DL
LDLC SERPL CALC-MCNC: 71 MG/DL
LEUKOCYTE ESTERASE URINE: NEGATIVE
LYMPHOCYTES # BLD AUTO: 2.23 K/UL
LYMPHOCYTES NFR BLD AUTO: 27.8 %
MAN DIFF?: NORMAL
MCHC RBC-ENTMCNC: 29.7 PG
MCHC RBC-ENTMCNC: 32.1 GM/DL
MCV RBC AUTO: 92.4 FL
MICROALBUMIN 24H UR DL<=1MG/L-MCNC: 5.7 MG/DL
MICROALBUMIN/CREAT 24H UR-RTO: 58 MG/G
MONOCYTES # BLD AUTO: 0.59 K/UL
MONOCYTES NFR BLD AUTO: 7.4 %
NEUTROPHILS # BLD AUTO: 4.99 K/UL
NEUTROPHILS NFR BLD AUTO: 62.2 %
NITRITE URINE: NEGATIVE
NONHDLC SERPL-MCNC: 88 MG/DL
PH URINE: 6
PLATELET # BLD AUTO: 228 K/UL
POTASSIUM SERPL-SCNC: 4 MMOL/L
PROT SERPL-MCNC: 7.1 G/DL
PROTEIN URINE: NORMAL MG/DL
PSA SERPL-MCNC: 2.87 NG/ML
RBC # BLD: 5.16 M/UL
RBC # FLD: 15.2 %
SODIUM SERPL-SCNC: 139 MMOL/L
SPECIFIC GRAVITY URINE: 1.02
TRIGL SERPL-MCNC: 91 MG/DL
TSH SERPL-ACNC: 2.45 UIU/ML
UROBILINOGEN URINE: 0.2 MG/DL
VIT B12 SERPL-MCNC: 856 PG/ML
WBC # FLD AUTO: 8.02 K/UL

## 2024-08-05 ENCOUNTER — APPOINTMENT (OUTPATIENT)
Dept: ORTHOPEDIC SURGERY | Facility: CLINIC | Age: 79
End: 2024-08-05

## 2024-08-05 PROBLEM — G56.03 CARPAL TUNNEL SYNDROME, BILATERAL: Status: ACTIVE | Noted: 2024-08-05

## 2024-08-05 PROCEDURE — 99204 OFFICE O/P NEW MOD 45 MIN: CPT

## 2024-09-30 ENCOUNTER — APPOINTMENT (OUTPATIENT)
Dept: ORTHOPEDIC SURGERY | Facility: CLINIC | Age: 79
End: 2024-09-30
Payer: MEDICARE

## 2024-09-30 PROCEDURE — 64721 CARPAL TUNNEL SURGERY: CPT | Mod: LT

## 2024-10-09 ENCOUNTER — NON-APPOINTMENT (OUTPATIENT)
Age: 79
End: 2024-10-09

## 2024-10-11 ENCOUNTER — APPOINTMENT (OUTPATIENT)
Dept: ORTHOPEDIC SURGERY | Facility: CLINIC | Age: 79
End: 2024-10-11
Payer: MEDICARE

## 2024-10-11 VITALS — HEIGHT: 66 IN | WEIGHT: 210 LBS | BODY MASS INDEX: 33.75 KG/M2

## 2024-10-11 DIAGNOSIS — G56.03 CARPAL TUNNEL SYNDROM,BILATERAL UPPER LIMBS: ICD-10-CM

## 2024-10-11 PROCEDURE — 99024 POSTOP FOLLOW-UP VISIT: CPT

## 2024-10-31 ENCOUNTER — APPOINTMENT (OUTPATIENT)
Dept: ENDOCRINOLOGY | Facility: CLINIC | Age: 79
End: 2024-10-31

## 2024-11-05 ENCOUNTER — APPOINTMENT (OUTPATIENT)
Dept: UROLOGY | Facility: CLINIC | Age: 79
End: 2024-11-05
Payer: MEDICARE

## 2024-11-05 DIAGNOSIS — R39.15 URGENCY OF URINATION: ICD-10-CM

## 2024-11-05 DIAGNOSIS — R82.89 OTHER ABNRM FNDNGS ON CYTOLOGICAL: ICD-10-CM

## 2024-11-05 DIAGNOSIS — R31.29 OTHER MICROSCOPIC HEMATURIA: ICD-10-CM

## 2024-11-05 DIAGNOSIS — N28.9 DISORDER OF KIDNEY AND URETER, UNSPECIFIED: ICD-10-CM

## 2024-11-05 DIAGNOSIS — N40.1 BENIGN PROSTATIC HYPERPLASIA WITH LOWER URINARY TRACT SYMPMS: ICD-10-CM

## 2024-11-05 DIAGNOSIS — N28.1 CYST OF KIDNEY, ACQUIRED: ICD-10-CM

## 2024-11-05 DIAGNOSIS — N13.8 BENIGN PROSTATIC HYPERPLASIA WITH LOWER URINARY TRACT SYMPMS: ICD-10-CM

## 2024-11-05 DIAGNOSIS — N20.0 CALCULUS OF KIDNEY: ICD-10-CM

## 2024-11-05 DIAGNOSIS — R97.20 ELEVATED PROSTATE, SPECIFIC ANTIGEN [PSA]: ICD-10-CM

## 2024-11-05 DIAGNOSIS — R79.89 OTHER SPECIFIED ABNORMAL FINDINGS OF BLOOD CHEMISTRY: ICD-10-CM

## 2024-11-05 PROCEDURE — 99442: CPT

## 2024-11-06 ENCOUNTER — APPOINTMENT (OUTPATIENT)
Dept: UROLOGY | Facility: CLINIC | Age: 79
End: 2024-11-06

## 2024-11-13 ENCOUNTER — APPOINTMENT (OUTPATIENT)
Dept: INTERNAL MEDICINE | Facility: CLINIC | Age: 79
End: 2024-11-13
Payer: MEDICARE

## 2024-11-13 VITALS
DIASTOLIC BLOOD PRESSURE: 74 MMHG | RESPIRATION RATE: 14 BRPM | HEIGHT: 66 IN | BODY MASS INDEX: 33.75 KG/M2 | TEMPERATURE: 97.4 F | SYSTOLIC BLOOD PRESSURE: 129 MMHG | OXYGEN SATURATION: 97 % | WEIGHT: 210 LBS | HEART RATE: 72 BPM

## 2024-11-13 DIAGNOSIS — E11.9 TYPE 2 DIABETES MELLITUS W/OUT COMPLICATIONS: ICD-10-CM

## 2024-11-13 DIAGNOSIS — R29.898 OTHER SYMPTOMS AND SIGNS INVOLVING THE MUSCULOSKELETAL SYSTEM: ICD-10-CM

## 2024-11-13 DIAGNOSIS — E78.5 HYPERLIPIDEMIA, UNSPECIFIED: ICD-10-CM

## 2024-11-13 DIAGNOSIS — R26.89 OTHER ABNORMALITIES OF GAIT AND MOBILITY: ICD-10-CM

## 2024-11-13 PROCEDURE — 99214 OFFICE O/P EST MOD 30 MIN: CPT

## 2024-11-13 RX ORDER — MUPIROCIN 20 MG/G
2 OINTMENT TOPICAL 3 TIMES DAILY
Qty: 1 | Refills: 5 | Status: ACTIVE | COMMUNITY
Start: 2024-11-13 | End: 1900-01-01

## 2024-11-18 ENCOUNTER — APPOINTMENT (OUTPATIENT)
Dept: ORTHOPEDIC SURGERY | Facility: CLINIC | Age: 79
End: 2024-11-18
Payer: MEDICARE

## 2024-11-18 DIAGNOSIS — G56.03 CARPAL TUNNEL SYNDROM,BILATERAL UPPER LIMBS: ICD-10-CM

## 2024-11-18 PROCEDURE — 99024 POSTOP FOLLOW-UP VISIT: CPT

## 2024-11-22 ENCOUNTER — APPOINTMENT (OUTPATIENT)
Dept: ORTHOPEDIC SURGERY | Facility: CLINIC | Age: 79
End: 2024-11-22

## 2024-11-22 VITALS — BODY MASS INDEX: 32.14 KG/M2 | HEIGHT: 66 IN | WEIGHT: 200 LBS

## 2024-11-22 DIAGNOSIS — M79.671 PAIN IN RIGHT LEG: ICD-10-CM

## 2024-11-22 DIAGNOSIS — M79.672 PAIN IN RIGHT LEG: ICD-10-CM

## 2024-11-22 DIAGNOSIS — E11.40 TYPE 2 DIABETES MELLITUS WITH DIABETIC NEUROPATHY, UNSPECIFIED: ICD-10-CM

## 2024-11-22 DIAGNOSIS — M76.829 POSTERIOR TIBIAL TENDINITIS, UNSPECIFIED LEG: ICD-10-CM

## 2024-11-22 DIAGNOSIS — M79.605 PAIN IN RIGHT LEG: ICD-10-CM

## 2024-11-22 DIAGNOSIS — M79.604 PAIN IN RIGHT LEG: ICD-10-CM

## 2024-11-22 PROCEDURE — 73610 X-RAY EXAM OF ANKLE: CPT | Mod: 50

## 2024-11-22 PROCEDURE — 73630 X-RAY EXAM OF FOOT: CPT | Mod: 50

## 2024-11-22 PROCEDURE — 99203 OFFICE O/P NEW LOW 30 MIN: CPT

## 2025-01-08 ENCOUNTER — APPOINTMENT (OUTPATIENT)
Dept: INTERNAL MEDICINE | Facility: CLINIC | Age: 80
End: 2025-01-08
Payer: MEDICARE

## 2025-01-08 VITALS
HEIGHT: 66 IN | OXYGEN SATURATION: 96 % | DIASTOLIC BLOOD PRESSURE: 80 MMHG | TEMPERATURE: 99.9 F | WEIGHT: 200 LBS | HEART RATE: 104 BPM | BODY MASS INDEX: 32.14 KG/M2 | SYSTOLIC BLOOD PRESSURE: 137 MMHG | RESPIRATION RATE: 14 BRPM

## 2025-01-08 DIAGNOSIS — J02.9 ACUTE PHARYNGITIS, UNSPECIFIED: ICD-10-CM

## 2025-01-08 DIAGNOSIS — N28.1 CYST OF KIDNEY, ACQUIRED: ICD-10-CM

## 2025-01-08 PROCEDURE — 99214 OFFICE O/P EST MOD 30 MIN: CPT

## 2025-01-08 RX ORDER — FLUTICASONE PROPIONATE 50 UG/1
50 SPRAY, METERED NASAL
Qty: 1 | Refills: 1 | Status: ACTIVE | COMMUNITY
Start: 2025-01-08 | End: 1900-01-01

## 2025-01-16 ENCOUNTER — APPOINTMENT (OUTPATIENT)
Dept: NEUROLOGY | Facility: CLINIC | Age: 80
End: 2025-01-16

## 2025-01-24 ENCOUNTER — EMERGENCY (EMERGENCY)
Facility: HOSPITAL | Age: 80
LOS: 1 days | Discharge: HOME | End: 2025-01-24
Attending: EMERGENCY MEDICINE
Payer: MEDICARE

## 2025-01-24 VITALS
DIASTOLIC BLOOD PRESSURE: 85 MMHG | TEMPERATURE: 98 F | HEART RATE: 89 BPM | SYSTOLIC BLOOD PRESSURE: 138 MMHG | HEIGHT: 68 IN | OXYGEN SATURATION: 96 % | RESPIRATION RATE: 20 BRPM | WEIGHT: 184.97 LBS

## 2025-01-24 DIAGNOSIS — Z90.89 ACQUIRED ABSENCE OF OTHER ORGANS: Chronic | ICD-10-CM

## 2025-01-24 DIAGNOSIS — Z90.49 ACQUIRED ABSENCE OF OTHER SPECIFIED PARTS OF DIGESTIVE TRACT: Chronic | ICD-10-CM

## 2025-01-24 DIAGNOSIS — Z96.652 PRESENCE OF LEFT ARTIFICIAL KNEE JOINT: Chronic | ICD-10-CM

## 2025-01-24 DIAGNOSIS — Z98.890 OTHER SPECIFIED POSTPROCEDURAL STATES: Chronic | ICD-10-CM

## 2025-01-24 DIAGNOSIS — Z96.651 PRESENCE OF RIGHT ARTIFICIAL KNEE JOINT: Chronic | ICD-10-CM

## 2025-01-24 PROCEDURE — 70450 CT HEAD/BRAIN W/O DYE: CPT | Mod: 26

## 2025-01-24 PROCEDURE — 70450 CT HEAD/BRAIN W/O DYE: CPT | Mod: 26,77

## 2025-01-24 PROCEDURE — 70486 CT MAXILLOFACIAL W/O DYE: CPT | Mod: 26

## 2025-01-24 PROCEDURE — 99283 EMERGENCY DEPT VISIT LOW MDM: CPT

## 2025-01-24 PROCEDURE — 72125 CT NECK SPINE W/O DYE: CPT | Mod: 26

## 2025-01-24 PROCEDURE — 76377 3D RENDER W/INTRP POSTPROCES: CPT | Mod: 26

## 2025-01-24 PROCEDURE — 99285 EMERGENCY DEPT VISIT HI MDM: CPT

## 2025-01-24 RX ORDER — ACETAMINOPHEN 80 MG/.8ML
975 SOLUTION/ DROPS ORAL ONCE
Refills: 0 | Status: COMPLETED | OUTPATIENT
Start: 2025-01-24 | End: 2025-01-24

## 2025-01-24 RX ORDER — CLOSTRIDIUM TETANI TOXOID ANTIGEN (FORMALDEHYDE INACTIVATED), CORYNEBACTERIUM DIPHTHERIAE TOXOID ANTIGEN (FORMALDEHYDE INACTIVATED), BORDETELLA PERTUSSIS TOXOID ANTIGEN (GLUTARALDEHYDE INACTIVATED), BORDETELLA PERTUSSIS FILAMENTOUS HEMAGGLUTININ ANTIGEN (FORMALDEHYDE INACTIVATED), BORDETELLA PERTUSSIS PERTACTIN ANTIGEN, AND BORDETELLA PERTUSSIS FIMBRIAE 2/3 ANTIGEN 5; 2; 2.5; 5; 3; 5 [LF]/.5ML; [LF]/.5ML; UG/.5ML; UG/.5ML; UG/.5ML; UG/.5ML
0.5 INJECTION, SUSPENSION INTRAMUSCULAR ONCE
Refills: 0 | Status: COMPLETED | OUTPATIENT
Start: 2025-01-24 | End: 2025-01-24

## 2025-01-24 RX ADMIN — ACETAMINOPHEN 975 MILLIGRAM(S): 80 SOLUTION/ DROPS ORAL at 13:59

## 2025-01-24 RX ADMIN — ACETAMINOPHEN 975 MILLIGRAM(S): 80 SOLUTION/ DROPS ORAL at 22:00

## 2025-01-24 RX ADMIN — CLOSTRIDIUM TETANI TOXOID ANTIGEN (FORMALDEHYDE INACTIVATED), CORYNEBACTERIUM DIPHTHERIAE TOXOID ANTIGEN (FORMALDEHYDE INACTIVATED), BORDETELLA PERTUSSIS TOXOID ANTIGEN (GLUTARALDEHYDE INACTIVATED), BORDETELLA PERTUSSIS FILAMENTOUS HEMAGGLUTININ ANTIGEN (FORMALDEHYDE INACTIVATED), BORDETELLA PERTUSSIS PERTACTIN ANTIGEN, AND BORDETELLA PERTUSSIS FIMBRIAE 2/3 ANTIGEN 0.5 MILLILITER(S): 5; 2; 2.5; 5; 3; 5 INJECTION, SUSPENSION INTRAMUSCULAR at 13:59

## 2025-01-24 RX ADMIN — ACETAMINOPHEN 975 MILLIGRAM(S): 80 SOLUTION/ DROPS ORAL at 14:23

## 2025-01-24 NOTE — ED PROVIDER NOTE - CARE PLAN
Principal Discharge DX:	[facility] (closed head injury), initial encounter   1 Principal Discharge DX:	[facility] (closed head injury), initial encounter  Secondary Diagnosis:	Nasal fracture  Secondary Diagnosis:	Facial abrasion

## 2025-01-24 NOTE — ED PROVIDER NOTE - PHYSICAL EXAMINATION
NAD. VSS. Afebrile. A&Ox4. +PERRL, EOMI. Nasal swelling, abrasions on dorsum, tender, and post epistaxis without septal hematoma. +Left periorbital tender. +Multiple abrasion on left forehead with tender. Neck supple. Lungs clear. No spinal tender. No chest wall, rib, or cva tender. ABD soft, non tender. No hip tender. Neuro- intact.

## 2025-01-24 NOTE — ED PROVIDER NOTE - OBJECTIVE STATEMENT
80yo male with PMHx of DM, HLD, no ASA or AC was brought to ED by EMS c/o facial injury and neck soreness s/p mechanical fall today. Reports he bent over for picking up a something and fell forward hitting  his face on the ground. Denies LOC. Denies visual changes or N/V. Denies sensory changes or weakness to extremities. Denies CP/SOB/ABD pain. Denies back pain or hip pain. Denies fever, chills, or recent sickness. Unsure last TD.

## 2025-01-24 NOTE — ED PROVIDER NOTE - PROGRESS NOTE ADDITIONAL3
Returned pt call, pt states that she is unsure of who she should see next regarding the pain she is still experiencing, pt states that at her last appt Dr. Escobar mentioned surgery that may help with pt pain/functionality, pt wants to exhaust all other less invasive options before resorting to surgical intervention. This nurse offered a f/u appt with Dr. Escobar but pt wants to talk to Dr. Sellers office before making a decision, this nurse told pt that if she decides she would like to see Dr. Escobar we would be glad to schedule her.  
Additional Progress Note...

## 2025-01-24 NOTE — ED ADULT NURSE NOTE - NSFALLRISKINTERV_ED_ALL_ED
poor plus
Assistance OOB with selected safe patient handling equipment if applicable/Assistance with ambulation/Communicate fall risk and risk factors to all staff, patient, and family/Monitor gait and stability/Provide visual cue: yellow wristband, yellow gown, etc/Reinforce activity limits and safety measures with patient and family/Call bell, personal items and telephone in reach/Instruct patient to call for assistance before getting out of bed/chair/stretcher/Non-slip footwear applied when patient is off stretcher/[place] to call system/Physically safe environment - no spills, clutter or unnecessary equipment/Purposeful Proactive Rounding/Room/bathroom lighting operational, light cord in reach

## 2025-01-24 NOTE — ED PROVIDER NOTE - CARE PROVIDERS DIRECT ADDRESSES
,[email or web address]

## 2025-01-24 NOTE — ED PROVIDER NOTE - NSFOLLOWUPINSTRUCTIONS_ED_ALL_ED_FT
1. Take Tylenol 650mg every 4-6 hours as needed for pain  2. Follow up with your doctor in 1-2 days  3. Expect some nausea, vomiting, headache, dizziness and blurry vision  4. Return to the ED for persistent vomiting, weakness or numbness on one side of the body, loss of consciousness or any concerns.    Closed Head Injury    A closed head injury is an injury to your head that may or may not involve a traumatic brain injury (TBI). Symptoms of TBI can be short or long lasting and include headache, dizziness, interference with memory or speech, fatigue, confusion, changes in sleep, mood changes, nausea, depression/anxiety, and dulling of senses. Make sure to obtain proper rest which includes getting plenty of sleep, avoiding excessive visual stimulation, and avoiding activities that may cause physical or mental stress. Avoid any situation where there is potential for another head injury, including sports.    SEEK IMMEDIATE MEDICAL CARE IF YOU HAVE ANY OF THE FOLLOWING SYMPTOMS: unusual drowsiness, vomiting, severe dizziness, seizures, lightheadedness, muscular weakness, different pupil sizes, visual changes, or clear or bloody discharge from your ears or nose. 1. Take Tylenol 650mg every 4-6 hours as needed for pain  2. Follow up with your doctor in 1-2 days  3. Expect some nausea, vomiting, headache, dizziness and blurry vision  4. Return to the ED for persistent vomiting, weakness or numbness on one side of the body, loss of consciousness or any concerns.  5. Follow up with neurosurgeon Dr. Byrd for reevaluation, call Monday for appointment.  6. Follow up with plastic surgeon Dr. Murray for nasal bone fracture, call Monday for appointment.  7. Follow up with your Dr. for reevolution.   Closed Head Injury    A closed head injury is an injury to your head that may or may not involve a traumatic brain injury (TBI). Symptoms of TBI can be short or long lasting and include headache, dizziness, interference with memory or speech, fatigue, confusion, changes in sleep, mood changes, nausea, depression/anxiety, and dulling of senses. Make sure to obtain proper rest which includes getting plenty of sleep, avoiding excessive visual stimulation, and avoiding activities that may cause physical or mental stress. Avoid any situation where there is potential for another head injury, including sports.    SEEK IMMEDIATE MEDICAL CARE IF YOU HAVE ANY OF THE FOLLOWING SYMPTOMS: unusual drowsiness, vomiting, severe dizziness, seizures, lightheadedness, muscular weakness, different pupil sizes, visual changes, or clear or bloody discharge from your ears or nose.

## 2025-01-24 NOTE — CONSULT NOTE ADULT - SUBJECTIVE AND OBJECTIVE BOX
p (1480)     HPI: 79M no PMH no AC AP p/f mech fall. CTH trace L parafalcine SDH vs calcification. 5h CTH stable. Exam: intact     --Anticoagulation:    =====================  PAST MEDICAL HISTORY   Prediabetes    Obesity    Hyperlipidemia    Left knee pain      PAST SURGICAL HISTORY   H/O total knee replacement, right    History of tonsillectomy    History of appendectomy    H/O elbow surgery    H/O total knee replacement, left      No Known Allergies      MEDICATIONS:  Antibiotics:    Neuro:    Other:      SOCIAL HISTORY:   Occupation:   Marital Status:     FAMILY HISTORY:  FH: HTN (hypertension) (Father)        ROS: Negative except per HPI    LABS:

## 2025-01-24 NOTE — ED ADULT NURSE NOTE - OBJECTIVE STATEMENT
Patient  is  alert  and  oriented x4.  Color is  good  and  skin warm to touch.  He  tripped  and  fell down . Facial  abrasions  noted.  He  denies  any  LOC  or  dizziness

## 2025-01-24 NOTE — ED PROVIDER NOTE - ATTENDING APP SHARED VISIT CONTRIBUTION OF CARE
Dr. Arellano: I performed a face to face bedside interview with patient regarding history of present illness, review of symptoms and past medical history. I completed an independent physical exam.  I have discussed patient's plan of care with ANNE.   I agree with note as stated above, having amended the EMR as needed to reflect my findings.   This includes HISTORY OF PRESENT ILLNESS, HIV, PAST MEDICAL/SURGICAL/FAMILY/SOCIAL HISTORY, ALLERGIES AND HOME MEDICATIONS, REVIEW OF SYSTEMS, PHYSICAL EXAM, and any PROGRESS NOTES during the time I functioned as the attending physician for this patient.    see mdm

## 2025-01-24 NOTE — ED PROVIDER NOTE - PATIENT PORTAL LINK FT
You can access the FollowMyHealth Patient Portal offered by [facility] by registering at the following website: [email or web address]. By joining [employer]s FollowMyHealth portal, you will also be able to view your health information using other applications (apps) compatible with our system. You can access the FollowMyHealth Patient Portal offered by [facility] by registering at the following website: [email or web address]. By joining [employer]’s FollowMyHealth portal, you will also be able to view your health information using other applications (apps) compatible with our system.

## 2025-01-24 NOTE — ED PROVIDER NOTE - CARE PROVIDER_API CALL
Ric Byrd  Neurosurgery  [address], [address]  [place] [address]  Phone: [phone]  Fax: [phone]  Follow Up Time: 4-6 Days    Bright Murray  Plastic Surgery  [address] [place] [address]  Phone: [phone]  Fax: [phone]  Follow Up Time:

## 2025-01-24 NOTE — ED PROVIDER NOTE - SHIFT CHANGE DETAILS
f/u CTs, reassess f/u CTs, reassess    Ratna from Eileen  Attending MD Segundo: 79M w s/p trip and fall onto face, suspect nasal fx, no nasal septal hematoma, persistent epistaxis, pending CTH/CTMF/CTCSp

## 2025-01-24 NOTE — CONSULT NOTE ADULT - ASSESSMENT
79M no PMH no AC AP p/f mech fall. CTH trace L parafalcine SDH vs calcification. 5h CTH stable. Exam: intact   -No neurosurgical intervention  -Fu outpatient with Dr. Byrd as needed

## 2025-01-24 NOTE — ED PROVIDER NOTE - CLINICAL SUMMARY MEDICAL DECISION MAKING FREE TEXT BOX
Dr. Arellano: 79-year-old male no past medical history, not on any blood thinners, here with daughter and BIBEMS status post mechanical trip and fall on the concrete where he landed flat on his face, did not brace his fall.  No LOC.  Last Tdap unknown.    Gen: No acute distress  HEENT: Mucous membranes moist, pink conjunctivae, EOMI  CV: RRR, no clubbing/cyanosis/edema  Resp: CTAB  GI: Abdomen soft, NT, ND. Normal BS. No rebound, no guarding  : No CVAT  Neuro: A&O x 3, moving all 4 extremities  MSK: No zygomatic tenderness to palpation, pelvis stable, no pain with range of motion of bilateral hips, minimal tenderness to palpation over the left knee, no laxity bilateral knees.  No midline spinal tenderness to palpation.  No chest wall tenderness to palpation.  No malocclusion  Skin: Multiple abrasions over the forehead, nose, zygoma.    Well-appearing patient presenting with mechanical fall and obvious head and facial injury.  Discussed with daughter at bedside, will get head CT, C-spine CT and maxillofacial CT and update Tdap.  Will reassess.

## 2025-01-24 NOTE — CONSULT NOTE ADULT - ATTENDING COMMENTS
79M no PMH no AC AP p/f mech fall. CTH trace L parafalcine SDH vs calcification. 5h CTH stable.  -No neurosurgical intervention  -Fu outpatient with Dr. Byrd as needed

## 2025-01-25 ENCOUNTER — NON-APPOINTMENT (OUTPATIENT)
Age: 80
End: 2025-01-25

## 2025-01-25 VITALS
HEART RATE: 72 BPM | RESPIRATION RATE: 16 BRPM | TEMPERATURE: 98 F | OXYGEN SATURATION: 96 % | DIASTOLIC BLOOD PRESSURE: 76 MMHG | SYSTOLIC BLOOD PRESSURE: 127 MMHG

## 2025-01-25 LAB
ALBUMIN SERPL ELPH-MCNC: 4 G/DL — SIGNIFICANT CHANGE UP (ref 3.3–5)
ALP SERPL-CCNC: 47 U/L — SIGNIFICANT CHANGE UP (ref 40–120)
ALT FLD-CCNC: 24 U/L — SIGNIFICANT CHANGE UP (ref 10–45)
ANION GAP SERPL CALC-SCNC: 11 MMOL/L — SIGNIFICANT CHANGE UP (ref 5–17)
APTT BLD: 30.1 SEC — SIGNIFICANT CHANGE UP (ref 24.5–35.6)
AST SERPL-CCNC: 22 U/L — SIGNIFICANT CHANGE UP (ref 10–40)
BASOPHILS # BLD AUTO: 0.06 K/UL — SIGNIFICANT CHANGE UP (ref 0–0.2)
BASOPHILS NFR BLD AUTO: 0.6 % — SIGNIFICANT CHANGE UP (ref 0–2)
BILIRUB SERPL-MCNC: 0.9 MG/DL — SIGNIFICANT CHANGE UP (ref 0.2–1.2)
BUN SERPL-MCNC: 19 MG/DL — SIGNIFICANT CHANGE UP (ref 7–23)
CALCIUM SERPL-MCNC: 10.4 MG/DL — SIGNIFICANT CHANGE UP (ref 8.4–10.5)
CHLORIDE SERPL-SCNC: 102 MMOL/L — SIGNIFICANT CHANGE UP (ref 96–108)
CO2 SERPL-SCNC: 24 MMOL/L — SIGNIFICANT CHANGE UP (ref 22–31)
CREAT SERPL-MCNC: 0.7 MG/DL — SIGNIFICANT CHANGE UP (ref 0.5–1.3)
EGFR: 94 ML/MIN/1.73M2 — SIGNIFICANT CHANGE UP
EOSINOPHIL # BLD AUTO: 0.18 K/UL — SIGNIFICANT CHANGE UP (ref 0–0.5)
EOSINOPHIL NFR BLD AUTO: 1.8 % — SIGNIFICANT CHANGE UP (ref 0–6)
GLUCOSE SERPL-MCNC: 134 MG/DL — HIGH (ref 70–99)
HCT VFR BLD CALC: 44.1 % — SIGNIFICANT CHANGE UP (ref 39–50)
HGB BLD-MCNC: 14.5 G/DL — SIGNIFICANT CHANGE UP (ref 13–17)
IMM GRANULOCYTES NFR BLD AUTO: 0.4 % — SIGNIFICANT CHANGE UP (ref 0–0.9)
INR BLD: 1.04 RATIO — SIGNIFICANT CHANGE UP (ref 0.85–1.16)
LYMPHOCYTES # BLD AUTO: 2.5 K/UL — SIGNIFICANT CHANGE UP (ref 1–3.3)
LYMPHOCYTES # BLD AUTO: 25.2 % — SIGNIFICANT CHANGE UP (ref 13–44)
MCHC RBC-ENTMCNC: 29 PG — SIGNIFICANT CHANGE UP (ref 27–34)
MCHC RBC-ENTMCNC: 32.9 G/DL — SIGNIFICANT CHANGE UP (ref 32–36)
MCV RBC AUTO: 88.2 FL — SIGNIFICANT CHANGE UP (ref 80–100)
MONOCYTES # BLD AUTO: 0.75 K/UL — SIGNIFICANT CHANGE UP (ref 0–0.9)
MONOCYTES NFR BLD AUTO: 7.5 % — SIGNIFICANT CHANGE UP (ref 2–14)
NEUTROPHILS # BLD AUTO: 6.41 K/UL — SIGNIFICANT CHANGE UP (ref 1.8–7.4)
NEUTROPHILS NFR BLD AUTO: 64.5 % — SIGNIFICANT CHANGE UP (ref 43–77)
NRBC # BLD: 0 /100 WBCS — SIGNIFICANT CHANGE UP (ref 0–0)
PLATELET # BLD AUTO: 242 K/UL — SIGNIFICANT CHANGE UP (ref 150–400)
POTASSIUM SERPL-MCNC: 3.9 MMOL/L — SIGNIFICANT CHANGE UP (ref 3.5–5.3)
POTASSIUM SERPL-SCNC: 3.9 MMOL/L — SIGNIFICANT CHANGE UP (ref 3.5–5.3)
PROT SERPL-MCNC: 7 G/DL — SIGNIFICANT CHANGE UP (ref 6–8.3)
PROTHROM AB SERPL-ACNC: 11.9 SEC — SIGNIFICANT CHANGE UP (ref 9.9–13.4)
RBC # BLD: 5 M/UL — SIGNIFICANT CHANGE UP (ref 4.2–5.8)
RBC # FLD: 14.1 % — SIGNIFICANT CHANGE UP (ref 10.3–14.5)
SODIUM SERPL-SCNC: 137 MMOL/L — SIGNIFICANT CHANGE UP (ref 135–145)
WBC # BLD: 9.94 K/UL — SIGNIFICANT CHANGE UP (ref 3.8–10.5)
WBC # FLD AUTO: 9.94 K/UL — SIGNIFICANT CHANGE UP (ref 3.8–10.5)

## 2025-01-25 PROCEDURE — 90715 TDAP VACCINE 7 YRS/> IM: CPT

## 2025-01-25 PROCEDURE — 70486 CT MAXILLOFACIAL W/O DYE: CPT | Mod: MC

## 2025-01-25 PROCEDURE — 76377 3D RENDER W/INTRP POSTPROCES: CPT

## 2025-01-25 PROCEDURE — 85610 PROTHROMBIN TIME: CPT

## 2025-01-25 PROCEDURE — 72125 CT NECK SPINE W/O DYE: CPT | Mod: MC

## 2025-01-25 PROCEDURE — 80053 COMPREHEN METABOLIC PANEL: CPT

## 2025-01-25 PROCEDURE — 70450 CT HEAD/BRAIN W/O DYE: CPT | Mod: MC

## 2025-01-25 PROCEDURE — 99284 EMERGENCY DEPT VISIT MOD MDM: CPT | Mod: 25

## 2025-01-25 PROCEDURE — 85025 COMPLETE CBC W/AUTO DIFF WBC: CPT

## 2025-01-25 PROCEDURE — 85730 THROMBOPLASTIN TIME PARTIAL: CPT

## 2025-01-25 RX ORDER — BACITRACIN 500 UNIT/G
1 OINTMENT (GRAM) TOPICAL ONCE
Refills: 0 | Status: DISCONTINUED | OUTPATIENT
Start: 2025-01-25 | End: 2025-01-28

## 2025-02-01 NOTE — DISCHARGE NOTE PROVIDER - NSDCCPTREATMENT_GEN_ALL_CORE_FT
Claritin or Zyrtec-D OTC according to package directions  Increase fluid intake  Nasal saline rinses  May also use OTC intranasal steroid such as Flonase according to package directions  Occasional insufflation as discussed  Follow-up for new or worsening symptoms to include increasing pain, fever, or other concerning symptoms    PRINCIPAL PROCEDURE  Procedure: Total knee replacement  Findings and Treatment:

## 2025-02-07 NOTE — ED ADULT TRIAGE NOTE - WEIGHT IN LBS
Colon Cancer Screening: Care Instructions  Overview     Colorectal cancer occurs in the colon or rectum. That's the lower part of your digestive system. It often starts in small growths called polyps in the colon or rectum. Polyps are usually found with screening tests. Depending on the type of test, any polyps found may be removed during the tests.  Colorectal cancer usually does not cause symptoms at first. But regular tests can help find it early, before it spreads and becomes harder to treat.  Your risk for colorectal cancer gets higher as you get older. Experts recommend starting screening at age 45 for people who are at average risk. Talk with your doctor about your risk and when to start and stop screening. You may have one of several tests.  Follow-up care is a key part of your treatment and safety. Be sure to make and go to all appointments, and call your doctor if you are having problems. It's also a good idea to know your test results and keep a list of the medicines you take.  What are the main screening tests for colon cancer?  The screening tests are:  Stool tests.  These include the guaiac fecal occult blood test (gFOBT), the fecal immunochemical test (FIT), and the combined fecal immunochemical test and stool DNA test (FIT-DNA). These tests check stool samples for signs of cancer. If your test is positive, you will need to have a colonoscopy.  Sigmoidoscopy.  This test lets your doctor look at the lining of your rectum and the lowest part of your colon. Your doctor uses a lighted tube called a sigmoidoscope. This test can't find cancers or polyps in the upper part of your colon. In some cases, polyps that are found can be removed. But if your doctor finds polyps, you will need to have a colonoscopy to check the upper part of your colon.  Colonoscopy.  This test lets your doctor look at the lining of your rectum and your entire colon. The doctor uses a thin, flexible tool called a colonoscope. It  184.9

## 2025-02-14 ENCOUNTER — APPOINTMENT (OUTPATIENT)
Dept: NEUROSURGERY | Facility: CLINIC | Age: 80
End: 2025-02-14

## 2025-02-14 VITALS
WEIGHT: 200 LBS | DIASTOLIC BLOOD PRESSURE: 82 MMHG | OXYGEN SATURATION: 95 % | HEIGHT: 66 IN | SYSTOLIC BLOOD PRESSURE: 142 MMHG | HEART RATE: 104 BPM | BODY MASS INDEX: 32.14 KG/M2

## 2025-02-14 DIAGNOSIS — S06.5XAA TRAUMATIC SUBDURAL HEMORRHAGE WITH LOSS OF CONSCIOUSNESS STATUS UNKNOWN, INITIAL ENCOUNTER: ICD-10-CM

## 2025-02-14 PROCEDURE — 99213 OFFICE O/P EST LOW 20 MIN: CPT

## 2025-02-22 PROBLEM — S06.5XAA SDH (SUBDURAL HEMATOMA): Status: ACTIVE | Noted: 2025-02-14

## 2025-02-24 ENCOUNTER — APPOINTMENT (OUTPATIENT)
Dept: CT IMAGING | Facility: CLINIC | Age: 80
End: 2025-02-24
Payer: MEDICARE

## 2025-02-24 ENCOUNTER — OUTPATIENT (OUTPATIENT)
Dept: OUTPATIENT SERVICES | Facility: HOSPITAL | Age: 80
LOS: 1 days | End: 2025-02-24
Payer: MEDICARE

## 2025-02-24 DIAGNOSIS — Z96.651 PRESENCE OF RIGHT ARTIFICIAL KNEE JOINT: Chronic | ICD-10-CM

## 2025-02-24 DIAGNOSIS — Z98.890 OTHER SPECIFIED POSTPROCEDURAL STATES: Chronic | ICD-10-CM

## 2025-02-24 DIAGNOSIS — Z90.89 ACQUIRED ABSENCE OF OTHER ORGANS: Chronic | ICD-10-CM

## 2025-02-24 DIAGNOSIS — Z90.49 ACQUIRED ABSENCE OF OTHER SPECIFIED PARTS OF DIGESTIVE TRACT: Chronic | ICD-10-CM

## 2025-02-24 DIAGNOSIS — Z96.652 PRESENCE OF LEFT ARTIFICIAL KNEE JOINT: Chronic | ICD-10-CM

## 2025-02-24 DIAGNOSIS — S06.5XAA TRAUMATIC SUBDURAL HEMORRHAGE WITH LOSS OF CONSCIOUSNESS STATUS UNKNOWN, INITIAL ENCOUNTER: ICD-10-CM

## 2025-02-24 PROCEDURE — 70450 CT HEAD/BRAIN W/O DYE: CPT | Mod: 26

## 2025-02-24 PROCEDURE — 70450 CT HEAD/BRAIN W/O DYE: CPT

## 2025-02-28 ENCOUNTER — APPOINTMENT (OUTPATIENT)
Dept: NEUROSURGERY | Facility: CLINIC | Age: 80
End: 2025-02-28
Payer: MEDICARE

## 2025-02-28 ENCOUNTER — NON-APPOINTMENT (OUTPATIENT)
Age: 80
End: 2025-02-28

## 2025-02-28 PROCEDURE — 99212 OFFICE O/P EST SF 10 MIN: CPT | Mod: 93

## 2025-03-03 ENCOUNTER — APPOINTMENT (OUTPATIENT)
Dept: ORTHOPEDIC SURGERY | Facility: CLINIC | Age: 80
End: 2025-03-03
Payer: MEDICARE

## 2025-03-03 DIAGNOSIS — G56.03 CARPAL TUNNEL SYNDROM,BILATERAL UPPER LIMBS: ICD-10-CM

## 2025-03-03 PROCEDURE — 99213 OFFICE O/P EST LOW 20 MIN: CPT | Mod: 25

## 2025-03-17 ENCOUNTER — APPOINTMENT (OUTPATIENT)
Dept: NEUROLOGY | Facility: CLINIC | Age: 80
End: 2025-03-17

## 2025-03-19 ENCOUNTER — OUTPATIENT (OUTPATIENT)
Dept: OUTPATIENT SERVICES | Facility: HOSPITAL | Age: 80
LOS: 1 days | End: 2025-03-19

## 2025-03-19 ENCOUNTER — APPOINTMENT (OUTPATIENT)
Dept: MRI IMAGING | Facility: CLINIC | Age: 80
End: 2025-03-19

## 2025-03-19 DIAGNOSIS — Z90.49 ACQUIRED ABSENCE OF OTHER SPECIFIED PARTS OF DIGESTIVE TRACT: Chronic | ICD-10-CM

## 2025-03-19 DIAGNOSIS — Z96.651 PRESENCE OF RIGHT ARTIFICIAL KNEE JOINT: Chronic | ICD-10-CM

## 2025-03-19 DIAGNOSIS — Z00.8 ENCOUNTER FOR OTHER GENERAL EXAMINATION: ICD-10-CM

## 2025-03-19 DIAGNOSIS — Z98.890 OTHER SPECIFIED POSTPROCEDURAL STATES: Chronic | ICD-10-CM

## 2025-03-19 DIAGNOSIS — Z90.89 ACQUIRED ABSENCE OF OTHER ORGANS: Chronic | ICD-10-CM

## 2025-03-19 DIAGNOSIS — Z96.652 PRESENCE OF LEFT ARTIFICIAL KNEE JOINT: Chronic | ICD-10-CM

## 2025-03-21 ENCOUNTER — APPOINTMENT (OUTPATIENT)
Dept: NEUROSURGERY | Facility: CLINIC | Age: 80
End: 2025-03-21

## 2025-03-21 ENCOUNTER — NON-APPOINTMENT (OUTPATIENT)
Age: 80
End: 2025-03-21

## 2025-03-27 ENCOUNTER — OUTPATIENT (OUTPATIENT)
Dept: OUTPATIENT SERVICES | Facility: HOSPITAL | Age: 80
LOS: 1 days | End: 2025-03-27
Payer: MEDICARE

## 2025-03-27 ENCOUNTER — APPOINTMENT (OUTPATIENT)
Dept: MRI IMAGING | Facility: CLINIC | Age: 80
End: 2025-03-27

## 2025-03-27 DIAGNOSIS — Z98.890 OTHER SPECIFIED POSTPROCEDURAL STATES: Chronic | ICD-10-CM

## 2025-03-27 DIAGNOSIS — Z96.651 PRESENCE OF RIGHT ARTIFICIAL KNEE JOINT: Chronic | ICD-10-CM

## 2025-03-27 DIAGNOSIS — Z90.89 ACQUIRED ABSENCE OF OTHER ORGANS: Chronic | ICD-10-CM

## 2025-03-27 DIAGNOSIS — Z96.652 PRESENCE OF LEFT ARTIFICIAL KNEE JOINT: Chronic | ICD-10-CM

## 2025-03-27 DIAGNOSIS — S06.5XAA TRAUMATIC SUBDURAL HEMORRHAGE WITH LOSS OF CONSCIOUSNESS STATUS UNKNOWN, INITIAL ENCOUNTER: ICD-10-CM

## 2025-03-27 DIAGNOSIS — Z90.49 ACQUIRED ABSENCE OF OTHER SPECIFIED PARTS OF DIGESTIVE TRACT: Chronic | ICD-10-CM

## 2025-03-27 PROCEDURE — 70551 MRI BRAIN STEM W/O DYE: CPT | Mod: 26

## 2025-03-27 PROCEDURE — 70551 MRI BRAIN STEM W/O DYE: CPT

## 2025-05-05 ENCOUNTER — APPOINTMENT (OUTPATIENT)
Dept: NEUROSURGERY | Facility: CLINIC | Age: 80
End: 2025-05-05
Payer: MEDICARE

## 2025-05-05 DIAGNOSIS — D32.9 BENIGN NEOPLASM OF MENINGES, UNSPECIFIED: ICD-10-CM

## 2025-05-05 PROCEDURE — 99212 OFFICE O/P EST SF 10 MIN: CPT | Mod: 93

## 2025-05-12 ENCOUNTER — APPOINTMENT (OUTPATIENT)
Dept: UROLOGY | Facility: CLINIC | Age: 80
End: 2025-05-12

## 2025-06-04 NOTE — HISTORY OF PRESENT ILLNESS
[de-identified] : 78 year old male presents for ear cleaning. States has not had a cleaning in 25 years. Denies hearing loss. no Vertigo, tinnitus, pain, drainage or facial weakness.
No

## 2025-07-07 ENCOUNTER — LABORATORY RESULT (OUTPATIENT)
Age: 80
End: 2025-07-07

## 2025-07-07 ENCOUNTER — APPOINTMENT (OUTPATIENT)
Dept: INTERNAL MEDICINE | Facility: CLINIC | Age: 80
End: 2025-07-07

## 2025-07-07 VITALS
TEMPERATURE: 98.1 F | HEART RATE: 70 BPM | RESPIRATION RATE: 14 BRPM | HEIGHT: 66 IN | WEIGHT: 208 LBS | DIASTOLIC BLOOD PRESSURE: 78 MMHG | SYSTOLIC BLOOD PRESSURE: 137 MMHG | OXYGEN SATURATION: 97 % | BODY MASS INDEX: 33.43 KG/M2

## 2025-07-07 RX ORDER — CHROMIUM 200 MCG
TABLET ORAL
Refills: 0 | Status: ACTIVE | COMMUNITY

## 2025-07-07 RX ORDER — MUPIROCIN 20 MG/G
2 OINTMENT TOPICAL 3 TIMES DAILY
Qty: 1 | Refills: 5 | Status: ACTIVE | COMMUNITY
Start: 2025-07-07 | End: 1900-01-01

## 2025-07-07 RX ORDER — NYSTATIN 100000 [USP'U]/G
100000 POWDER TOPICAL
Qty: 1 | Refills: 5 | Status: ACTIVE | COMMUNITY
Start: 2025-07-07 | End: 1900-01-01

## 2025-07-07 RX ORDER — CYANOCOBALAMIN (VITAMIN B-12) 1000 MCG
TABLET ORAL
Refills: 0 | Status: ACTIVE | COMMUNITY

## 2025-07-13 LAB
25(OH)D3 SERPL-MCNC: 36.2 NG/ML
ALBUMIN SERPL ELPH-MCNC: 4.5 G/DL
ALP BLD-CCNC: 52 U/L
ALT SERPL-CCNC: 24 U/L
ANION GAP SERPL CALC-SCNC: 14 MMOL/L
AST SERPL-CCNC: 28 U/L
BASOPHILS # BLD AUTO: 0.05 K/UL
BASOPHILS NFR BLD AUTO: 0.7 %
BILIRUB SERPL-MCNC: 0.9 MG/DL
BUN SERPL-MCNC: 16 MG/DL
CALCIUM SERPL-MCNC: 10.4 MG/DL
CHLORIDE SERPL-SCNC: 104 MMOL/L
CHOLEST SERPL-MCNC: 141 MG/DL
CO2 SERPL-SCNC: 22 MMOL/L
CREAT SERPL-MCNC: 0.69 MG/DL
EGFRCR SERPLBLD CKD-EPI 2021: 94 ML/MIN/1.73M2
EOSINOPHIL # BLD AUTO: 0.14 K/UL
EOSINOPHIL NFR BLD AUTO: 1.9 %
ESTIMATED AVERAGE GLUCOSE: 137 MG/DL
GLUCOSE SERPL-MCNC: 115 MG/DL
HBA1C MFR BLD HPLC: 6.4 %
HCT VFR BLD CALC: 45 %
HDLC SERPL-MCNC: 56 MG/DL
HGB BLD-MCNC: 14.8 G/DL
IMM GRANULOCYTES NFR BLD AUTO: 0.5 %
LDLC SERPL-MCNC: 66 MG/DL
LYMPHOCYTES # BLD AUTO: 2.13 K/UL
LYMPHOCYTES NFR BLD AUTO: 28.5 %
MAN DIFF?: NORMAL
MCHC RBC-ENTMCNC: 29.8 PG
MCHC RBC-ENTMCNC: 32.9 G/DL
MCV RBC AUTO: 90.5 FL
MONOCYTES # BLD AUTO: 0.56 K/UL
MONOCYTES NFR BLD AUTO: 7.5 %
NEUTROPHILS # BLD AUTO: 4.55 K/UL
NEUTROPHILS NFR BLD AUTO: 60.9 %
NONHDLC SERPL-MCNC: 84 MG/DL
PLATELET # BLD AUTO: 232 K/UL
POTASSIUM SERPL-SCNC: 4.1 MMOL/L
PROT SERPL-MCNC: 6.8 G/DL
PSA SERPL-MCNC: 4.23 NG/ML
RBC # BLD: 4.97 M/UL
RBC # FLD: 13.9 %
SODIUM SERPL-SCNC: 140 MMOL/L
TRIGL SERPL-MCNC: 99 MG/DL
TSH SERPL-ACNC: 2.22 UIU/ML
VIT B12 SERPL-MCNC: 794 PG/ML
WBC # FLD AUTO: 7.47 K/UL

## 2025-07-15 LAB
APPEARANCE: CLEAR
BILIRUBIN URINE: NEGATIVE
BLOOD URINE: ABNORMAL
CALCIUM SERPL-MCNC: 10.4 MG/DL
COLOR: YELLOW
GLUCOSE QUALITATIVE U: NEGATIVE MG/DL
KETONES URINE: NEGATIVE MG/DL
LEUKOCYTE ESTERASE URINE: NEGATIVE
NITRITE URINE: NEGATIVE
PARATHYROID HORMONE INTACT: 81 PG/ML
PH URINE: 6
PROTEIN URINE: NORMAL MG/DL
SPECIFIC GRAVITY URINE: 1.02
UROBILINOGEN URINE: 0.2 MG/DL

## 2025-07-17 ENCOUNTER — APPOINTMENT (OUTPATIENT)
Dept: ENDOCRINOLOGY | Facility: CLINIC | Age: 80
End: 2025-07-17

## 2025-07-17 VITALS
BODY MASS INDEX: 36.86 KG/M2 | TEMPERATURE: 97.9 F | DIASTOLIC BLOOD PRESSURE: 89 MMHG | WEIGHT: 208 LBS | SYSTOLIC BLOOD PRESSURE: 137 MMHG | HEART RATE: 99 BPM | OXYGEN SATURATION: 98 % | HEIGHT: 63 IN

## 2025-07-17 PROCEDURE — 99204 OFFICE O/P NEW MOD 45 MIN: CPT

## 2025-07-17 RX ORDER — BLOOD SUGAR DIAGNOSTIC
STRIP MISCELLANEOUS
Qty: 3 | Refills: 3 | Status: ACTIVE | COMMUNITY
Start: 2025-07-17 | End: 1900-01-01

## 2025-07-17 RX ORDER — LANCETS
EACH MISCELLANEOUS
Qty: 2 | Refills: 3 | Status: ACTIVE | COMMUNITY
Start: 2025-07-17 | End: 1900-01-01

## 2025-07-17 RX ORDER — BLOOD-GLUCOSE METER
W/DEVICE EACH MISCELLANEOUS
Qty: 1 | Refills: 0 | Status: ACTIVE | COMMUNITY
Start: 2025-07-17 | End: 1900-01-01

## 2025-08-03 PROBLEM — E66.9 OBESITY (BMI 30-39.9): Status: ACTIVE | Noted: 2025-08-03

## 2025-08-04 ENCOUNTER — APPOINTMENT (OUTPATIENT)
Dept: UROLOGY | Facility: CLINIC | Age: 80
End: 2025-08-04
Payer: MEDICARE

## 2025-08-04 VITALS
RESPIRATION RATE: 14 BRPM | WEIGHT: 208 LBS | TEMPERATURE: 98.1 F | HEART RATE: 88 BPM | OXYGEN SATURATION: 98 % | HEIGHT: 63 IN | DIASTOLIC BLOOD PRESSURE: 83 MMHG | SYSTOLIC BLOOD PRESSURE: 150 MMHG | BODY MASS INDEX: 36.86 KG/M2

## 2025-08-04 DIAGNOSIS — N40.1 BENIGN PROSTATIC HYPERPLASIA WITH LOWER URINARY TRACT SYMPMS: ICD-10-CM

## 2025-08-04 DIAGNOSIS — R97.20 ELEVATED PROSTATE, SPECIFIC ANTIGEN [PSA]: ICD-10-CM

## 2025-08-04 DIAGNOSIS — N13.8 BENIGN PROSTATIC HYPERPLASIA WITH LOWER URINARY TRACT SYMPMS: ICD-10-CM

## 2025-08-04 PROCEDURE — 99214 OFFICE O/P EST MOD 30 MIN: CPT

## 2025-08-04 PROCEDURE — G2211 COMPLEX E/M VISIT ADD ON: CPT

## 2025-08-21 ENCOUNTER — APPOINTMENT (OUTPATIENT)
Dept: PODIATRY | Facility: CLINIC | Age: 80
End: 2025-08-21

## 2025-08-21 DIAGNOSIS — M79.673 PAIN IN UNSPECIFIED FOOT: ICD-10-CM

## 2025-08-21 DIAGNOSIS — M54.10 RADICULOPATHY, SITE UNSPECIFIED: ICD-10-CM

## 2025-08-21 DIAGNOSIS — Z91.81 HISTORY OF FALLING: ICD-10-CM

## 2025-08-21 DIAGNOSIS — E11.49 TYPE 2 DIABETES MELLITUS WITH OTHER DIABETIC NEUROLOGICAL COMPLICATION: ICD-10-CM

## 2025-08-21 PROCEDURE — 99203 OFFICE O/P NEW LOW 30 MIN: CPT

## 2025-08-28 PROBLEM — M54.10 RADICULOPATHY: Status: ACTIVE | Noted: 2025-08-28

## 2025-08-28 PROBLEM — M79.673 PAIN, FOOT: Status: ACTIVE | Noted: 2025-08-28

## 2025-08-28 PROBLEM — E11.49 DM (DIABETES MELLITUS), TYPE 2 WITH NEUROLOGICAL COMPLICATIONS: Status: ACTIVE | Noted: 2025-08-28

## 2025-08-28 PROBLEM — Z91.81 RISK FOR FALLS: Status: ACTIVE | Noted: 2025-08-28

## 2025-09-06 ENCOUNTER — OUTPATIENT (OUTPATIENT)
Dept: OUTPATIENT SERVICES | Facility: HOSPITAL | Age: 80
LOS: 1 days | End: 2025-09-06
Payer: MEDICARE

## 2025-09-06 ENCOUNTER — APPOINTMENT (OUTPATIENT)
Dept: MRI IMAGING | Facility: CLINIC | Age: 80
End: 2025-09-06

## 2025-09-06 DIAGNOSIS — Z98.890 OTHER SPECIFIED POSTPROCEDURAL STATES: Chronic | ICD-10-CM

## 2025-09-06 DIAGNOSIS — Z90.49 ACQUIRED ABSENCE OF OTHER SPECIFIED PARTS OF DIGESTIVE TRACT: Chronic | ICD-10-CM

## 2025-09-06 DIAGNOSIS — D32.9 BENIGN NEOPLASM OF MENINGES, UNSPECIFIED: ICD-10-CM

## 2025-09-06 DIAGNOSIS — Z90.89 ACQUIRED ABSENCE OF OTHER ORGANS: Chronic | ICD-10-CM

## 2025-09-06 DIAGNOSIS — Z96.652 PRESENCE OF LEFT ARTIFICIAL KNEE JOINT: Chronic | ICD-10-CM

## 2025-09-06 DIAGNOSIS — S06.5XAA TRAUMATIC SUBDURAL HEMORRHAGE WITH LOSS OF CONSCIOUSNESS STATUS UNKNOWN, INITIAL ENCOUNTER: ICD-10-CM

## 2025-09-06 DIAGNOSIS — Z96.651 PRESENCE OF RIGHT ARTIFICIAL KNEE JOINT: Chronic | ICD-10-CM

## 2025-09-06 PROCEDURE — A9585: CPT

## 2025-09-06 PROCEDURE — 70553 MRI BRAIN STEM W/O & W/DYE: CPT | Mod: 26

## 2025-09-06 PROCEDURE — 70553 MRI BRAIN STEM W/O & W/DYE: CPT

## 2025-09-10 ENCOUNTER — APPOINTMENT (OUTPATIENT)
Dept: ENDOCRINOLOGY | Facility: CLINIC | Age: 80
End: 2025-09-10

## 2025-09-10 VITALS
DIASTOLIC BLOOD PRESSURE: 93 MMHG | OXYGEN SATURATION: 94 % | SYSTOLIC BLOOD PRESSURE: 166 MMHG | BODY MASS INDEX: 35.61 KG/M2 | TEMPERATURE: 97.8 F | HEART RATE: 81 BPM | WEIGHT: 201 LBS

## 2025-09-10 LAB — GLUCOSE BLDC GLUCOMTR-MCNC: 130

## 2025-09-10 RX ORDER — SEMAGLUTIDE 0.68 MG/ML
2 INJECTION, SOLUTION SUBCUTANEOUS
Qty: 1 | Refills: 2 | Status: ACTIVE | COMMUNITY
Start: 2025-09-10 | End: 1900-01-01

## 2025-09-19 ENCOUNTER — APPOINTMENT (OUTPATIENT)
Age: 80
End: 2025-09-19
Payer: MEDICARE

## 2025-09-19 ENCOUNTER — NON-APPOINTMENT (OUTPATIENT)
Age: 80
End: 2025-09-19

## 2025-09-19 PROCEDURE — 92134 CPTRZ OPH DX IMG PST SGM RTA: CPT

## 2025-09-19 PROCEDURE — 92014 COMPRE OPH EXAM EST PT 1/>: CPT

## 2025-09-19 PROCEDURE — 92201 OPSCPY EXTND RTA DRAW UNI/BI: CPT

## (undated) DEVICE — WARMING BLANKET UPPER ADULT

## (undated) DEVICE — PRESSURE INFUSOR BAG 3000ML

## (undated) DEVICE — VENODYNE/SCD SLEEVE CALF MEDIUM

## (undated) DEVICE — TUBING TUR 2 PRONG

## (undated) DEVICE — GLV 7 PROTEXIS (WHITE)

## (undated) DEVICE — SYR LUER LOK 10CC

## (undated) DEVICE — LABELS BLANK W PEN

## (undated) DEVICE — Device

## (undated) DEVICE — SYR LUER LOK 20CC

## (undated) DEVICE — BASIN SET DOUBLE

## (undated) DEVICE — POSITIONER STRAP ARMBOARD VELCRO TS-30

## (undated) DEVICE — SOL IRR BAG NS 0.9% 3000ML

## (undated) DEVICE — TUBING SUCTION NONCONDUCTIVE 6MM X 12FT

## (undated) DEVICE — GOWN XL W TOWEL

## (undated) DEVICE — PREP BETADINE SPONGE STICKS

## (undated) DEVICE — SOL IRR POUR NS 0.9% 500ML

## (undated) DEVICE — DRAPE C ARM UNIVERSAL

## (undated) DEVICE — DRAPE BACK TABLE COVER 44X90"